# Patient Record
Sex: MALE | Race: WHITE | Employment: FULL TIME | URBAN - METROPOLITAN AREA
[De-identification: names, ages, dates, MRNs, and addresses within clinical notes are randomized per-mention and may not be internally consistent; named-entity substitution may affect disease eponyms.]

---

## 2018-02-08 PROBLEM — Z30.09 VASECTOMY EVALUATION: Status: ACTIVE | Noted: 2018-02-08

## 2018-04-05 ENCOUNTER — APPOINTMENT (OUTPATIENT)
Dept: CT IMAGING | Age: 43
End: 2018-04-05
Payer: COMMERCIAL

## 2018-04-05 ENCOUNTER — HOSPITAL ENCOUNTER (EMERGENCY)
Age: 43
Discharge: HOME | End: 2018-04-05
Payer: COMMERCIAL

## 2018-04-05 VITALS
RESPIRATION RATE: 16 BRPM | HEIGHT: 69 IN | TEMPERATURE: 97.9 F | WEIGHT: 145 LBS | BODY MASS INDEX: 21.48 KG/M2 | SYSTOLIC BLOOD PRESSURE: 130 MMHG | OXYGEN SATURATION: 98 % | DIASTOLIC BLOOD PRESSURE: 80 MMHG | HEART RATE: 80 BPM
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
## 2018-04-05 DIAGNOSIS — S16.1XXA STRAIN OF NECK MUSCLE, INITIAL ENCOUNTER: ICD-10-CM

## 2018-04-05 PROCEDURE — 99284 EMERGENCY DEPT VISIT MOD MDM: CPT

## 2018-04-05 PROCEDURE — 6370000000 HC RX 637 (ALT 250 FOR IP): Performed by: PHYSICIAN ASSISTANT

## 2018-04-05 PROCEDURE — 70450 CT HEAD/BRAIN W/O DYE: CPT

## 2018-04-05 PROCEDURE — 72125 CT NECK SPINE W/O DYE: CPT

## 2018-04-05 RX ORDER — ACETAMINOPHEN 325 MG/1
650 TABLET ORAL ONCE
Status: COMPLETED | OUTPATIENT
Start: 2018-04-05 | End: 2018-04-05

## 2018-04-05 RX ADMIN — ACETAMINOPHEN 650 MG: 325 TABLET, FILM COATED ORAL at 17:50

## 2018-04-05 ASSESSMENT — PAIN DESCRIPTION - DESCRIPTORS: DESCRIPTORS: ACHING;THROBBING

## 2018-04-05 ASSESSMENT — PAIN DESCRIPTION - FREQUENCY: FREQUENCY: CONTINUOUS

## 2018-04-05 ASSESSMENT — PAIN DESCRIPTION - LOCATION: LOCATION: NECK

## 2018-04-05 ASSESSMENT — PAIN SCALES - GENERAL
PAINLEVEL_OUTOF10: 8
PAINLEVEL_OUTOF10: 9
PAINLEVEL_OUTOF10: 9

## 2018-04-23 ENCOUNTER — APPOINTMENT (OUTPATIENT)
Dept: GENERAL RADIOLOGY | Age: 43
End: 2018-04-23
Attending: INTERNAL MEDICINE
Payer: COMMERCIAL

## 2018-04-23 ENCOUNTER — HOSPITAL ENCOUNTER (OUTPATIENT)
Age: 43
Setting detail: OBSERVATION
Discharge: HOME | End: 2018-04-25
Attending: INTERNAL MEDICINE | Admitting: INTERNAL MEDICINE
Payer: COMMERCIAL

## 2018-04-23 DIAGNOSIS — M54.2 NECK PAIN: Primary | ICD-10-CM

## 2018-04-23 PROBLEM — R52 INTRACTABLE PAIN: Status: ACTIVE | Noted: 2018-04-23

## 2018-04-23 PROCEDURE — 6360000002 HC RX W HCPCS: Performed by: INTERNAL MEDICINE

## 2018-04-23 PROCEDURE — G0378 HOSPITAL OBSERVATION PER HR: HCPCS

## 2018-04-23 PROCEDURE — 72050 X-RAY EXAM NECK SPINE 4/5VWS: CPT

## 2018-04-23 PROCEDURE — 99219 PR INITIAL OBSERVATION CARE/DAY 50 MINUTES: CPT | Performed by: NEUROLOGICAL SURGERY

## 2018-04-23 PROCEDURE — 6370000000 HC RX 637 (ALT 250 FOR IP): Performed by: INTERNAL MEDICINE

## 2018-04-23 RX ORDER — QUETIAPINE FUMARATE 25 MG/1
50 TABLET, FILM COATED ORAL NIGHTLY
Status: DISCONTINUED | OUTPATIENT
Start: 2018-04-23 | End: 2018-04-25 | Stop reason: HOSPADM

## 2018-04-23 RX ORDER — HYDROCODONE BITARTRATE AND ACETAMINOPHEN 5; 325 MG/1; MG/1
1 TABLET ORAL EVERY 6 HOURS PRN
Status: ON HOLD | COMMUNITY
End: 2018-04-25 | Stop reason: HOSPADM

## 2018-04-23 RX ORDER — SIMVASTATIN 20 MG
20 TABLET ORAL NIGHTLY
Status: DISCONTINUED | OUTPATIENT
Start: 2018-04-23 | End: 2018-04-25 | Stop reason: HOSPADM

## 2018-04-23 RX ORDER — DEXAMETHASONE SODIUM PHOSPHATE 4 MG/ML
4 INJECTION, SOLUTION INTRA-ARTICULAR; INTRALESIONAL; INTRAMUSCULAR; INTRAVENOUS; SOFT TISSUE EVERY 8 HOURS
Status: DISCONTINUED | OUTPATIENT
Start: 2018-04-23 | End: 2018-04-25 | Stop reason: HOSPADM

## 2018-04-23 RX ORDER — IBUPROFEN 600 MG/1
600 TABLET ORAL 3 TIMES DAILY
Status: DISCONTINUED | OUTPATIENT
Start: 2018-04-23 | End: 2018-04-25 | Stop reason: HOSPADM

## 2018-04-23 RX ORDER — CYCLOBENZAPRINE HCL 10 MG
10 TABLET ORAL NIGHTLY
Status: DISCONTINUED | OUTPATIENT
Start: 2018-04-23 | End: 2018-04-25

## 2018-04-23 RX ORDER — HYDROCODONE BITARTRATE AND ACETAMINOPHEN 5; 325 MG/1; MG/1
1 TABLET ORAL EVERY 6 HOURS PRN
Status: DISCONTINUED | OUTPATIENT
Start: 2018-04-23 | End: 2018-04-25 | Stop reason: HOSPADM

## 2018-04-23 RX ORDER — GABAPENTIN 300 MG/1
300 CAPSULE ORAL 3 TIMES DAILY
Status: DISCONTINUED | OUTPATIENT
Start: 2018-04-23 | End: 2018-04-25 | Stop reason: HOSPADM

## 2018-04-23 RX ADMIN — HYDROCODONE BITARTRATE AND ACETAMINOPHEN 1 TABLET: 5; 325 TABLET ORAL at 20:52

## 2018-04-23 RX ADMIN — DEXAMETHASONE SODIUM PHOSPHATE 4 MG: 4 INJECTION, SOLUTION INTRAMUSCULAR; INTRAVENOUS at 20:51

## 2018-04-23 RX ADMIN — QUETIAPINE FUMARATE 50 MG: 25 TABLET ORAL at 20:52

## 2018-04-23 RX ADMIN — SIMVASTATIN 20 MG: 20 TABLET, FILM COATED ORAL at 20:52

## 2018-04-23 RX ADMIN — CYCLOBENZAPRINE 10 MG: 10 TABLET, FILM COATED ORAL at 20:52

## 2018-04-23 RX ADMIN — GABAPENTIN 300 MG: 300 CAPSULE ORAL at 20:52

## 2018-04-23 ASSESSMENT — PAIN DESCRIPTION - DESCRIPTORS
DESCRIPTORS: ACHING;DISCOMFORT;DULL
DESCRIPTORS: ACHING;DISCOMFORT;DULL

## 2018-04-23 ASSESSMENT — PAIN DESCRIPTION - LOCATION
LOCATION: NECK
LOCATION: NECK

## 2018-04-23 ASSESSMENT — PAIN DESCRIPTION - FREQUENCY
FREQUENCY: CONTINUOUS
FREQUENCY: CONTINUOUS

## 2018-04-23 ASSESSMENT — PAIN DESCRIPTION - PROGRESSION
CLINICAL_PROGRESSION: GRADUALLY WORSENING
CLINICAL_PROGRESSION: GRADUALLY IMPROVING

## 2018-04-23 ASSESSMENT — PAIN SCALES - GENERAL
PAINLEVEL_OUTOF10: 8
PAINLEVEL_OUTOF10: 9
PAINLEVEL_OUTOF10: 7

## 2018-04-23 ASSESSMENT — PAIN DESCRIPTION - ONSET
ONSET: ON-GOING
ONSET: ON-GOING

## 2018-04-23 ASSESSMENT — PAIN DESCRIPTION - ORIENTATION
ORIENTATION: MID;LOWER
ORIENTATION: MID;LOWER

## 2018-04-23 ASSESSMENT — PAIN DESCRIPTION - PAIN TYPE
TYPE: ACUTE PAIN
TYPE: ACUTE PAIN

## 2018-04-24 ENCOUNTER — APPOINTMENT (OUTPATIENT)
Dept: MRI IMAGING | Age: 43
End: 2018-04-24
Attending: INTERNAL MEDICINE
Payer: COMMERCIAL

## 2018-04-24 PROCEDURE — G0378 HOSPITAL OBSERVATION PER HR: HCPCS

## 2018-04-24 PROCEDURE — 97161 PT EVAL LOW COMPLEX 20 MIN: CPT

## 2018-04-24 PROCEDURE — 6360000002 HC RX W HCPCS: Performed by: INTERNAL MEDICINE

## 2018-04-24 PROCEDURE — G8987 SELF CARE CURRENT STATUS: HCPCS

## 2018-04-24 PROCEDURE — 72156 MRI NECK SPINE W/O & W/DYE: CPT

## 2018-04-24 PROCEDURE — A9579 GAD-BASE MR CONTRAST NOS,1ML: HCPCS | Performed by: RADIOLOGY

## 2018-04-24 PROCEDURE — 6370000000 HC RX 637 (ALT 250 FOR IP): Performed by: INTERNAL MEDICINE

## 2018-04-24 PROCEDURE — 99225 PR SBSQ OBSERVATION CARE/DAY 25 MINUTES: CPT | Performed by: NEUROLOGICAL SURGERY

## 2018-04-24 PROCEDURE — G8978 MOBILITY CURRENT STATUS: HCPCS

## 2018-04-24 PROCEDURE — 2580000003 HC RX 258

## 2018-04-24 PROCEDURE — 97165 OT EVAL LOW COMPLEX 30 MIN: CPT

## 2018-04-24 PROCEDURE — G8988 SELF CARE GOAL STATUS: HCPCS

## 2018-04-24 PROCEDURE — 6360000004 HC RX CONTRAST MEDICATION: Performed by: RADIOLOGY

## 2018-04-24 RX ORDER — SODIUM CHLORIDE 0.9 % (FLUSH) 0.9 %
SYRINGE (ML) INJECTION
Status: COMPLETED
Start: 2018-04-24 | End: 2018-04-24

## 2018-04-24 RX ADMIN — IBUPROFEN 600 MG: 600 TABLET ORAL at 20:24

## 2018-04-24 RX ADMIN — QUETIAPINE FUMARATE 50 MG: 25 TABLET ORAL at 20:24

## 2018-04-24 RX ADMIN — CYCLOBENZAPRINE 10 MG: 10 TABLET, FILM COATED ORAL at 20:24

## 2018-04-24 RX ADMIN — HYDROCODONE BITARTRATE AND ACETAMINOPHEN 1 TABLET: 5; 325 TABLET ORAL at 11:39

## 2018-04-24 RX ADMIN — SIMVASTATIN 20 MG: 20 TABLET, FILM COATED ORAL at 20:25

## 2018-04-24 RX ADMIN — Medication: at 05:20

## 2018-04-24 RX ADMIN — HYDROCODONE BITARTRATE AND ACETAMINOPHEN 1 TABLET: 5; 325 TABLET ORAL at 05:17

## 2018-04-24 RX ADMIN — HYDROCODONE BITARTRATE AND ACETAMINOPHEN 1 TABLET: 5; 325 TABLET ORAL at 18:49

## 2018-04-24 RX ADMIN — IBUPROFEN 600 MG: 600 TABLET ORAL at 16:18

## 2018-04-24 RX ADMIN — GABAPENTIN 300 MG: 300 CAPSULE ORAL at 11:29

## 2018-04-24 RX ADMIN — DEXAMETHASONE SODIUM PHOSPHATE 4 MG: 4 INJECTION, SOLUTION INTRAMUSCULAR; INTRAVENOUS at 05:16

## 2018-04-24 RX ADMIN — GABAPENTIN 300 MG: 300 CAPSULE ORAL at 20:25

## 2018-04-24 RX ADMIN — GABAPENTIN 300 MG: 300 CAPSULE ORAL at 14:06

## 2018-04-24 RX ADMIN — GADOTERIDOL 13 ML: 279.3 INJECTION, SOLUTION INTRAVENOUS at 10:08

## 2018-04-24 RX ADMIN — DEXAMETHASONE SODIUM PHOSPHATE 4 MG: 4 INJECTION, SOLUTION INTRAMUSCULAR; INTRAVENOUS at 14:06

## 2018-04-24 RX ADMIN — DEXAMETHASONE SODIUM PHOSPHATE 4 MG: 4 INJECTION, SOLUTION INTRAMUSCULAR; INTRAVENOUS at 20:24

## 2018-04-24 RX ADMIN — IBUPROFEN 600 MG: 600 TABLET ORAL at 11:29

## 2018-04-24 ASSESSMENT — PAIN DESCRIPTION - DESCRIPTORS
DESCRIPTORS: ACHING;DISCOMFORT;SHOOTING
DESCRIPTORS: ACHING;CONSTANT;DISCOMFORT
DESCRIPTORS: ACHING;BURNING;DISCOMFORT;TINGLING

## 2018-04-24 ASSESSMENT — PAIN DESCRIPTION - ONSET
ONSET: ON-GOING
ONSET: ON-GOING

## 2018-04-24 ASSESSMENT — PAIN DESCRIPTION - DIRECTION
RADIATING_TOWARDS: RIGHT ARM
RADIATING_TOWARDS: RIGHT ARM

## 2018-04-24 ASSESSMENT — PAIN SCALES - GENERAL
PAINLEVEL_OUTOF10: 7
PAINLEVEL_OUTOF10: 6
PAINLEVEL_OUTOF10: 5
PAINLEVEL_OUTOF10: 7
PAINLEVEL_OUTOF10: 2
PAINLEVEL_OUTOF10: 7
PAINLEVEL_OUTOF10: 0
PAINLEVEL_OUTOF10: 8

## 2018-04-24 ASSESSMENT — ACTIVITIES OF DAILY LIVING (ADL): EFFECT OF PAIN ON DAILY ACTIVITIES: DISCOMFORT

## 2018-04-24 ASSESSMENT — PAIN DESCRIPTION - LOCATION
LOCATION: BACK;NECK
LOCATION: NECK;BACK
LOCATION: BACK;NECK

## 2018-04-24 ASSESSMENT — PAIN DESCRIPTION - PROGRESSION
CLINICAL_PROGRESSION: NOT CHANGED
CLINICAL_PROGRESSION: GRADUALLY WORSENING

## 2018-04-24 ASSESSMENT — PAIN DESCRIPTION - PAIN TYPE
TYPE: ACUTE PAIN

## 2018-04-24 ASSESSMENT — PAIN DESCRIPTION - FREQUENCY
FREQUENCY: CONTINUOUS
FREQUENCY: CONTINUOUS

## 2018-04-25 VITALS
DIASTOLIC BLOOD PRESSURE: 72 MMHG | BODY MASS INDEX: 22.28 KG/M2 | WEIGHT: 147 LBS | HEIGHT: 68 IN | RESPIRATION RATE: 16 BRPM | SYSTOLIC BLOOD PRESSURE: 140 MMHG | HEART RATE: 88 BPM | OXYGEN SATURATION: 97 % | TEMPERATURE: 97.6 F

## 2018-04-25 PROCEDURE — 6360000002 HC RX W HCPCS: Performed by: INTERNAL MEDICINE

## 2018-04-25 PROCEDURE — 99225 PR SBSQ OBSERVATION CARE/DAY 25 MINUTES: CPT | Performed by: NEUROLOGICAL SURGERY

## 2018-04-25 PROCEDURE — 6370000000 HC RX 637 (ALT 250 FOR IP): Performed by: PHYSICIAN ASSISTANT

## 2018-04-25 PROCEDURE — G0378 HOSPITAL OBSERVATION PER HR: HCPCS

## 2018-04-25 PROCEDURE — 6370000000 HC RX 637 (ALT 250 FOR IP): Performed by: INTERNAL MEDICINE

## 2018-04-25 RX ORDER — HYDROCODONE BITARTRATE AND ACETAMINOPHEN 5; 325 MG/1; MG/1
2 TABLET ORAL EVERY 4 HOURS PRN
Qty: 80 TABLET | Refills: 0 | Status: SHIPPED | OUTPATIENT
Start: 2018-04-25 | End: 2018-04-25 | Stop reason: SDUPTHER

## 2018-04-25 RX ORDER — TIZANIDINE 4 MG/1
4 TABLET ORAL EVERY 8 HOURS PRN
Qty: 60 TABLET | Refills: 0 | Status: SHIPPED | OUTPATIENT
Start: 2018-04-25 | End: 2018-06-01 | Stop reason: SDUPTHER

## 2018-04-25 RX ORDER — TIZANIDINE 4 MG/1
4 TABLET ORAL EVERY 6 HOURS PRN
Status: DISCONTINUED | OUTPATIENT
Start: 2018-04-25 | End: 2018-04-25 | Stop reason: HOSPADM

## 2018-04-25 RX ADMIN — DEXAMETHASONE SODIUM PHOSPHATE 4 MG: 4 INJECTION, SOLUTION INTRAMUSCULAR; INTRAVENOUS at 12:12

## 2018-04-25 RX ADMIN — GABAPENTIN 300 MG: 300 CAPSULE ORAL at 13:07

## 2018-04-25 RX ADMIN — HYDROCODONE BITARTRATE AND ACETAMINOPHEN 1 TABLET: 5; 325 TABLET ORAL at 07:53

## 2018-04-25 RX ADMIN — GABAPENTIN 300 MG: 300 CAPSULE ORAL at 07:52

## 2018-04-25 RX ADMIN — TIZANIDINE 4 MG: 4 TABLET ORAL at 13:07

## 2018-04-25 RX ADMIN — IBUPROFEN 600 MG: 600 TABLET ORAL at 13:08

## 2018-04-25 RX ADMIN — DEXAMETHASONE SODIUM PHOSPHATE 4 MG: 4 INJECTION, SOLUTION INTRAMUSCULAR; INTRAVENOUS at 05:43

## 2018-04-25 RX ADMIN — HYDROCODONE BITARTRATE AND ACETAMINOPHEN 1 TABLET: 5; 325 TABLET ORAL at 00:52

## 2018-04-25 RX ADMIN — HYDROCODONE BITARTRATE AND ACETAMINOPHEN 1 TABLET: 5; 325 TABLET ORAL at 13:54

## 2018-04-25 RX ADMIN — IBUPROFEN 600 MG: 600 TABLET ORAL at 08:52

## 2018-04-25 ASSESSMENT — PAIN DESCRIPTION - PAIN TYPE
TYPE: ACUTE PAIN

## 2018-04-25 ASSESSMENT — PAIN DESCRIPTION - ONSET
ONSET: ON-GOING

## 2018-04-25 ASSESSMENT — PAIN DESCRIPTION - DESCRIPTORS
DESCRIPTORS: ACHING;DISCOMFORT
DESCRIPTORS: ACHING;DISCOMFORT;SORE
DESCRIPTORS: ACHING;DISCOMFORT
DESCRIPTORS: ACHING;DISCOMFORT;NAGGING

## 2018-04-25 ASSESSMENT — PAIN SCALES - GENERAL
PAINLEVEL_OUTOF10: 5
PAINLEVEL_OUTOF10: 6
PAINLEVEL_OUTOF10: 7
PAINLEVEL_OUTOF10: 5
PAINLEVEL_OUTOF10: 0

## 2018-04-25 ASSESSMENT — PAIN DESCRIPTION - ORIENTATION
ORIENTATION: LOWER;MID
ORIENTATION: LOWER;MID
ORIENTATION: MID;LOWER

## 2018-04-25 ASSESSMENT — PAIN DESCRIPTION - FREQUENCY
FREQUENCY: CONTINUOUS

## 2018-04-25 ASSESSMENT — PAIN DESCRIPTION - LOCATION
LOCATION: BACK;NECK
LOCATION: NECK
LOCATION: NECK
LOCATION: BACK;NECK
LOCATION: BACK;NECK

## 2018-04-25 ASSESSMENT — PAIN DESCRIPTION - PROGRESSION: CLINICAL_PROGRESSION: NOT CHANGED

## 2018-04-25 ASSESSMENT — PAIN DESCRIPTION - DIRECTION: RADIATING_TOWARDS: RT ARM

## 2018-07-30 ENCOUNTER — APPOINTMENT (OUTPATIENT)
Dept: CT IMAGING | Age: 43
DRG: 203 | End: 2018-07-30
Attending: INTERNAL MEDICINE
Payer: COMMERCIAL

## 2018-07-30 ENCOUNTER — APPOINTMENT (OUTPATIENT)
Dept: GENERAL RADIOLOGY | Age: 43
DRG: 203 | End: 2018-07-30
Attending: INTERNAL MEDICINE
Payer: COMMERCIAL

## 2018-07-30 ENCOUNTER — HOSPITAL ENCOUNTER (INPATIENT)
Age: 43
LOS: 7 days | Discharge: HOME | DRG: 203 | End: 2018-08-06
Attending: INTERNAL MEDICINE | Admitting: INTERNAL MEDICINE
Payer: COMMERCIAL

## 2018-07-30 PROBLEM — J98.01 BRONCHOSPASM: Status: ACTIVE | Noted: 2018-07-30

## 2018-07-30 LAB
ALBUMIN SERPL-MCNC: 4.2 G/DL (ref 3.5–5.2)
ALP BLD-CCNC: 59 U/L (ref 40–129)
ALT SERPL-CCNC: 27 U/L (ref 0–40)
ANION GAP SERPL CALCULATED.3IONS-SCNC: 11 MMOL/L (ref 7–16)
AST SERPL-CCNC: 17 U/L (ref 0–39)
BILIRUB SERPL-MCNC: 0.4 MG/DL (ref 0–1.2)
BUN BLDV-MCNC: 14 MG/DL (ref 6–20)
CALCIUM SERPL-MCNC: 9.3 MG/DL (ref 8.6–10.2)
CHLORIDE BLD-SCNC: 105 MMOL/L (ref 98–107)
CO2: 25 MMOL/L (ref 22–29)
CREAT SERPL-MCNC: 0.9 MG/DL (ref 0.7–1.2)
EKG ATRIAL RATE: 80 BPM
EKG P AXIS: 36 DEGREES
EKG P-R INTERVAL: 150 MS
EKG Q-T INTERVAL: 386 MS
EKG QRS DURATION: 104 MS
EKG QTC CALCULATION (BAZETT): 445 MS
EKG R AXIS: 24 DEGREES
EKG T AXIS: 27 DEGREES
EKG VENTRICULAR RATE: 80 BPM
FILM ARRAY ADENOVIRUS: NORMAL
FILM ARRAY BORDETELLA PERTUSSIS: NORMAL
FILM ARRAY CHLAMYDOPHILIA PNEUMONIAE: NORMAL
FILM ARRAY CORONAVIRUS 229E: NORMAL
FILM ARRAY CORONAVIRUS HKU1: NORMAL
FILM ARRAY CORONAVIRUS NL63: NORMAL
FILM ARRAY CORONAVIRUS OC43: NORMAL
FILM ARRAY INFLUENZA A VIRUS 09H1: NORMAL
FILM ARRAY INFLUENZA A VIRUS H1: NORMAL
FILM ARRAY INFLUENZA A VIRUS H3: NORMAL
FILM ARRAY INFLUENZA A VIRUS: NORMAL
FILM ARRAY INFLUENZA B: NORMAL
FILM ARRAY METAPNEUMOVIRUS: NORMAL
FILM ARRAY MYCOPLASMA PNEUMONIAE: NORMAL
FILM ARRAY PARAINFLUENZA VIRUS 1: NORMAL
FILM ARRAY PARAINFLUENZA VIRUS 2: NORMAL
FILM ARRAY PARAINFLUENZA VIRUS 3: NORMAL
FILM ARRAY PARAINFLUENZA VIRUS 4: NORMAL
FILM ARRAY RESPIRATORY SYNCITIAL VIRUS: NORMAL
FILM ARRAY RHINOVIRUS/ENTEROVIRUS: NORMAL
GFR AFRICAN AMERICAN: >60
GFR NON-AFRICAN AMERICAN: >60 ML/MIN/1.73
GLUCOSE BLD-MCNC: 106 MG/DL (ref 74–109)
HCT VFR BLD CALC: 39.1 % (ref 37–54)
HEMOGLOBIN: 13.6 G/DL (ref 12.5–16.5)
MCH RBC QN AUTO: 30.6 PG (ref 26–35)
MCHC RBC AUTO-ENTMCNC: 34.8 % (ref 32–34.5)
MCV RBC AUTO: 88.1 FL (ref 80–99.9)
PDW BLD-RTO: 12.7 FL (ref 11.5–15)
PLATELET # BLD: 236 E9/L (ref 130–450)
PMV BLD AUTO: 10 FL (ref 7–12)
POTASSIUM SERPL-SCNC: 3.2 MMOL/L (ref 3.5–5)
PRO-BNP: 32 PG/ML (ref 0–125)
RBC # BLD: 4.44 E12/L (ref 3.8–5.8)
SODIUM BLD-SCNC: 141 MMOL/L (ref 132–146)
TOTAL PROTEIN: 6.4 G/DL (ref 6.4–8.3)
WBC # BLD: 7.9 E9/L (ref 4.5–11.5)

## 2018-07-30 PROCEDURE — 1200000000 HC SEMI PRIVATE

## 2018-07-30 PROCEDURE — 93005 ELECTROCARDIOGRAM TRACING: CPT | Performed by: INTERNAL MEDICINE

## 2018-07-30 PROCEDURE — 87503 INFLUENZA DNA AMP PROB ADDL: CPT

## 2018-07-30 PROCEDURE — 71045 X-RAY EXAM CHEST 1 VIEW: CPT

## 2018-07-30 PROCEDURE — 71250 CT THORAX DX C-: CPT

## 2018-07-30 PROCEDURE — 80053 COMPREHEN METABOLIC PANEL: CPT

## 2018-07-30 PROCEDURE — 36415 COLL VENOUS BLD VENIPUNCTURE: CPT

## 2018-07-30 PROCEDURE — 87502 INFLUENZA DNA AMP PROBE: CPT

## 2018-07-30 PROCEDURE — 85027 COMPLETE CBC AUTOMATED: CPT

## 2018-07-30 PROCEDURE — 6370000000 HC RX 637 (ALT 250 FOR IP): Performed by: INTERNAL MEDICINE

## 2018-07-30 PROCEDURE — 94664 DEMO&/EVAL PT USE INHALER: CPT

## 2018-07-30 PROCEDURE — 87798 DETECT AGENT NOS DNA AMP: CPT

## 2018-07-30 PROCEDURE — 83880 ASSAY OF NATRIURETIC PEPTIDE: CPT

## 2018-07-30 PROCEDURE — 6360000002 HC RX W HCPCS: Performed by: INTERNAL MEDICINE

## 2018-07-30 PROCEDURE — 87486 CHLMYD PNEUM DNA AMP PROBE: CPT

## 2018-07-30 PROCEDURE — 87581 M.PNEUMON DNA AMP PROBE: CPT

## 2018-07-30 RX ORDER — GABAPENTIN 300 MG/1
300 CAPSULE ORAL 3 TIMES DAILY
Status: DISCONTINUED | OUTPATIENT
Start: 2018-07-30 | End: 2018-08-06 | Stop reason: HOSPADM

## 2018-07-30 RX ORDER — QUETIAPINE FUMARATE 25 MG/1
50 TABLET, FILM COATED ORAL NIGHTLY
Status: DISCONTINUED | OUTPATIENT
Start: 2018-07-30 | End: 2018-08-06 | Stop reason: HOSPADM

## 2018-07-30 RX ORDER — HYDROCODONE BITARTRATE AND ACETAMINOPHEN 5; 325 MG/1; MG/1
1 TABLET ORAL EVERY 4 HOURS PRN
COMMUNITY
End: 2019-02-22 | Stop reason: ALTCHOICE

## 2018-07-30 RX ORDER — IPRATROPIUM BROMIDE AND ALBUTEROL SULFATE 2.5; .5 MG/3ML; MG/3ML
1 SOLUTION RESPIRATORY (INHALATION)
Status: DISCONTINUED | OUTPATIENT
Start: 2018-07-30 | End: 2018-07-31

## 2018-07-30 RX ORDER — HYDROCODONE BITARTRATE AND ACETAMINOPHEN 5; 325 MG/1; MG/1
1 TABLET ORAL EVERY 4 HOURS PRN
Status: DISCONTINUED | OUTPATIENT
Start: 2018-07-30 | End: 2018-08-06 | Stop reason: HOSPADM

## 2018-07-30 RX ORDER — POTASSIUM CHLORIDE 20 MEQ/1
40 TABLET, EXTENDED RELEASE ORAL ONCE
Status: COMPLETED | OUTPATIENT
Start: 2018-07-30 | End: 2018-07-30

## 2018-07-30 RX ORDER — POTASSIUM CHLORIDE 20 MEQ/1
20 TABLET, EXTENDED RELEASE ORAL ONCE
Status: COMPLETED | OUTPATIENT
Start: 2018-07-30 | End: 2018-07-30

## 2018-07-30 RX ORDER — METHYLPREDNISOLONE SODIUM SUCCINATE 40 MG/ML
40 INJECTION, POWDER, LYOPHILIZED, FOR SOLUTION INTRAMUSCULAR; INTRAVENOUS EVERY 8 HOURS
Status: COMPLETED | OUTPATIENT
Start: 2018-07-30 | End: 2018-08-04

## 2018-07-30 RX ADMIN — HYDROCODONE BITARTRATE AND ACETAMINOPHEN 1 TABLET: 5; 325 TABLET ORAL at 17:13

## 2018-07-30 RX ADMIN — GABAPENTIN 300 MG: 300 CAPSULE ORAL at 17:00

## 2018-07-30 RX ADMIN — METHYLPREDNISOLONE SODIUM SUCCINATE 40 MG: 40 INJECTION, POWDER, LYOPHILIZED, FOR SOLUTION INTRAMUSCULAR; INTRAVENOUS at 17:00

## 2018-07-30 RX ADMIN — POTASSIUM CHLORIDE 20 MEQ: 1500 TABLET, EXTENDED RELEASE ORAL at 20:46

## 2018-07-30 RX ADMIN — GABAPENTIN 300 MG: 300 CAPSULE ORAL at 20:46

## 2018-07-30 RX ADMIN — IPRATROPIUM BROMIDE AND ALBUTEROL SULFATE 1 AMPULE: .5; 3 SOLUTION RESPIRATORY (INHALATION) at 19:46

## 2018-07-30 RX ADMIN — HYDROCODONE BITARTRATE AND ACETAMINOPHEN 1 TABLET: 5; 325 TABLET ORAL at 22:44

## 2018-07-30 RX ADMIN — POTASSIUM CHLORIDE 40 MEQ: 1500 TABLET, EXTENDED RELEASE ORAL at 18:20

## 2018-07-30 RX ADMIN — QUETIAPINE FUMARATE 50 MG: 25 TABLET ORAL at 20:46

## 2018-07-30 ASSESSMENT — PAIN DESCRIPTION - PROGRESSION
CLINICAL_PROGRESSION: GRADUALLY WORSENING
CLINICAL_PROGRESSION: GRADUALLY WORSENING

## 2018-07-30 ASSESSMENT — PAIN DESCRIPTION - DESCRIPTORS
DESCRIPTORS: CONSTANT;DISCOMFORT;SORE
DESCRIPTORS: ACHING;DISCOMFORT;CONSTANT

## 2018-07-30 ASSESSMENT — PAIN SCALES - GENERAL
PAINLEVEL_OUTOF10: 0
PAINLEVEL_OUTOF10: 8
PAINLEVEL_OUTOF10: 6
PAINLEVEL_OUTOF10: 5
PAINLEVEL_OUTOF10: 2

## 2018-07-30 ASSESSMENT — PAIN DESCRIPTION - ORIENTATION: ORIENTATION: RIGHT;LEFT;UPPER

## 2018-07-30 ASSESSMENT — PAIN DESCRIPTION - PAIN TYPE
TYPE: CHRONIC PAIN
TYPE: CHRONIC PAIN

## 2018-07-30 ASSESSMENT — PAIN DESCRIPTION - ONSET: ONSET: ON-GOING

## 2018-07-30 ASSESSMENT — PAIN DESCRIPTION - LOCATION
LOCATION: NECK
LOCATION: NECK;BACK

## 2018-07-30 ASSESSMENT — PAIN DESCRIPTION - FREQUENCY: FREQUENCY: CONTINUOUS

## 2018-07-31 LAB
ANION GAP SERPL CALCULATED.3IONS-SCNC: 13 MMOL/L (ref 7–16)
BUN BLDV-MCNC: 12 MG/DL (ref 6–20)
CALCIUM SERPL-MCNC: 9.8 MG/DL (ref 8.6–10.2)
CHLORIDE BLD-SCNC: 106 MMOL/L (ref 98–107)
CO2: 23 MMOL/L (ref 22–29)
CREAT SERPL-MCNC: 0.8 MG/DL (ref 0.7–1.2)
D DIMER: <200 NG/ML DDU
GFR AFRICAN AMERICAN: >60
GFR NON-AFRICAN AMERICAN: >60 ML/MIN/1.73
GLUCOSE BLD-MCNC: 143 MG/DL (ref 74–109)
POTASSIUM SERPL-SCNC: 4.3 MMOL/L (ref 3.5–5)
SODIUM BLD-SCNC: 142 MMOL/L (ref 132–146)

## 2018-07-31 PROCEDURE — 82785 ASSAY OF IGE: CPT

## 2018-07-31 PROCEDURE — 1200000000 HC SEMI PRIVATE

## 2018-07-31 PROCEDURE — 6360000002 HC RX W HCPCS: Performed by: INTERNAL MEDICINE

## 2018-07-31 PROCEDURE — 85378 FIBRIN DEGRADE SEMIQUANT: CPT

## 2018-07-31 PROCEDURE — 36415 COLL VENOUS BLD VENIPUNCTURE: CPT

## 2018-07-31 PROCEDURE — 6370000000 HC RX 637 (ALT 250 FOR IP): Performed by: INTERNAL MEDICINE

## 2018-07-31 PROCEDURE — 94640 AIRWAY INHALATION TREATMENT: CPT

## 2018-07-31 PROCEDURE — 80048 BASIC METABOLIC PNL TOTAL CA: CPT

## 2018-07-31 PROCEDURE — 2580000003 HC RX 258

## 2018-07-31 PROCEDURE — 87450 HC DIRECT STREP B ANTIGEN: CPT

## 2018-07-31 RX ORDER — FORMOTEROL FUMARATE 20 UG/2ML
20 SOLUTION RESPIRATORY (INHALATION) 2 TIMES DAILY
Status: DISCONTINUED | OUTPATIENT
Start: 2018-07-31 | End: 2018-08-06 | Stop reason: HOSPADM

## 2018-07-31 RX ORDER — SODIUM CHLORIDE 0.9 % (FLUSH) 0.9 %
SYRINGE (ML) INJECTION
Status: COMPLETED
Start: 2018-07-31 | End: 2018-07-31

## 2018-07-31 RX ORDER — BUDESONIDE 0.5 MG/2ML
1000 INHALANT ORAL 2 TIMES DAILY
Status: DISCONTINUED | OUTPATIENT
Start: 2018-07-31 | End: 2018-08-06 | Stop reason: HOSPADM

## 2018-07-31 RX ORDER — IPRATROPIUM BROMIDE AND ALBUTEROL SULFATE 2.5; .5 MG/3ML; MG/3ML
1 SOLUTION RESPIRATORY (INHALATION) EVERY 4 HOURS
Status: DISCONTINUED | OUTPATIENT
Start: 2018-07-31 | End: 2018-08-06 | Stop reason: HOSPADM

## 2018-07-31 RX ADMIN — IPRATROPIUM BROMIDE AND ALBUTEROL SULFATE 1 AMPULE: .5; 3 SOLUTION RESPIRATORY (INHALATION) at 11:55

## 2018-07-31 RX ADMIN — HYDROCODONE BITARTRATE AND ACETAMINOPHEN 1 TABLET: 5; 325 TABLET ORAL at 08:27

## 2018-07-31 RX ADMIN — HYDROCODONE BITARTRATE AND ACETAMINOPHEN 1 TABLET: 5; 325 TABLET ORAL at 12:35

## 2018-07-31 RX ADMIN — METHYLPREDNISOLONE SODIUM SUCCINATE 40 MG: 40 INJECTION, POWDER, LYOPHILIZED, FOR SOLUTION INTRAMUSCULAR; INTRAVENOUS at 16:56

## 2018-07-31 RX ADMIN — GABAPENTIN 300 MG: 300 CAPSULE ORAL at 20:20

## 2018-07-31 RX ADMIN — GABAPENTIN 300 MG: 300 CAPSULE ORAL at 10:04

## 2018-07-31 RX ADMIN — METHYLPREDNISOLONE SODIUM SUCCINATE 40 MG: 40 INJECTION, POWDER, LYOPHILIZED, FOR SOLUTION INTRAMUSCULAR; INTRAVENOUS at 10:04

## 2018-07-31 RX ADMIN — HYDROCODONE BITARTRATE AND ACETAMINOPHEN 1 TABLET: 5; 325 TABLET ORAL at 16:56

## 2018-07-31 RX ADMIN — METHYLPREDNISOLONE SODIUM SUCCINATE 40 MG: 40 INJECTION, POWDER, LYOPHILIZED, FOR SOLUTION INTRAMUSCULAR; INTRAVENOUS at 01:41

## 2018-07-31 RX ADMIN — Medication 10 ML: at 16:56

## 2018-07-31 RX ADMIN — HYDROCODONE BITARTRATE AND ACETAMINOPHEN 1 TABLET: 5; 325 TABLET ORAL at 21:28

## 2018-07-31 RX ADMIN — IPRATROPIUM BROMIDE AND ALBUTEROL SULFATE 1 AMPULE: .5; 3 SOLUTION RESPIRATORY (INHALATION) at 16:03

## 2018-07-31 RX ADMIN — QUETIAPINE FUMARATE 50 MG: 25 TABLET ORAL at 20:20

## 2018-07-31 RX ADMIN — GABAPENTIN 300 MG: 300 CAPSULE ORAL at 14:14

## 2018-07-31 RX ADMIN — BUDESONIDE 1000 MCG: 0.5 SUSPENSION RESPIRATORY (INHALATION) at 20:59

## 2018-07-31 RX ADMIN — Medication 10 ML: at 08:27

## 2018-07-31 RX ADMIN — HYDROCODONE BITARTRATE AND ACETAMINOPHEN 1 TABLET: 5; 325 TABLET ORAL at 02:45

## 2018-07-31 RX ADMIN — IPRATROPIUM BROMIDE AND ALBUTEROL SULFATE 1 AMPULE: .5; 3 SOLUTION RESPIRATORY (INHALATION) at 08:17

## 2018-07-31 RX ADMIN — FORMOTEROL FUMARATE DIHYDRATE 20 MCG: 20 SOLUTION RESPIRATORY (INHALATION) at 20:59

## 2018-07-31 ASSESSMENT — PAIN DESCRIPTION - FREQUENCY
FREQUENCY: CONTINUOUS

## 2018-07-31 ASSESSMENT — PAIN SCALES - GENERAL
PAINLEVEL_OUTOF10: 2
PAINLEVEL_OUTOF10: 6
PAINLEVEL_OUTOF10: 8
PAINLEVEL_OUTOF10: 6
PAINLEVEL_OUTOF10: 8

## 2018-07-31 ASSESSMENT — PAIN DESCRIPTION - DESCRIPTORS
DESCRIPTORS: ACHING;DISCOMFORT;DULL
DESCRIPTORS: ACHING;DISCOMFORT;SORE
DESCRIPTORS: ACHING;DISCOMFORT;SORE

## 2018-07-31 ASSESSMENT — PAIN DESCRIPTION - LOCATION
LOCATION: NECK;SHOULDER
LOCATION: NECK;BACK
LOCATION: NECK;SHOULDER

## 2018-07-31 ASSESSMENT — PAIN DESCRIPTION - PROGRESSION
CLINICAL_PROGRESSION: GRADUALLY WORSENING

## 2018-07-31 ASSESSMENT — PAIN DESCRIPTION - ORIENTATION
ORIENTATION: RIGHT;LEFT
ORIENTATION: RIGHT;LEFT;UPPER
ORIENTATION: RIGHT;LEFT

## 2018-07-31 ASSESSMENT — PAIN DESCRIPTION - PAIN TYPE
TYPE: CHRONIC PAIN

## 2018-07-31 ASSESSMENT — PAIN DESCRIPTION - ONSET
ONSET: ON-GOING

## 2018-07-31 NOTE — CONSULTS
rash on exposed extremities. Lymph: No cervical LAD. No supraclavicular LAD. Ext: No joint deformity. No clubbing. No cyanosis. No edema  Neuro: Awake. Follows commands. Positive pupils/gag/corneals. Normal pain response. Lab Results:  CBC:   Recent Labs      07/30/18   1700   WBC  7.9   HGB  13.6   HCT  39.1   MCV  88.1   PLT  236       BMP:  Recent Labs      07/30/18   1700  07/31/18   0515   NA  141  142   K  3.2*  4.3   CL  105  106   CO2  25  23   BUN  14  12   CREATININE  0.9  0.8    ALB:3,BILIDIR:3,BILITOT:3,ALKPHOS:3)@    PT/INR: No results for input(s): PROTIME, INR in the last 72 hours. Cultures:  Sputum: not available  Blood: not available    ABG:   No results for input(s): PH, PO2, PCO2, HCO3, BE, O2SAT, METHB, O2HB, COHB, O2CON, HHB, THB in the last 72 hours. Films:     CT Chest WO Contrast   Final Result      1. Subtle thickening of central airways could be a manifestation of   bronchitis/airway inflammation. 2. There may be a very small retrocardiac hiatus hernia, and nocturnal   reflux can result in subtle aspiration and bronchitis symptoms. 3. There is no evidence of chest wall or cardiovascular pathology, and   no evidence of organized pneumonia or effusion. XR CHEST PORTABLE   Final Result   No radiographic evidence of acute cardiopulmonary disease. Postoperative changes as above. .        Assessment:  1. Bronchospasm: Acute. Underlying asthma is not excluded. Unfortunately, because a differential count was never sent, atopy cannot be excluded nor can eosinophilia. The chest CT shows modest airway thickening but otherwise no significant pathology. The d-dimer being less than 200 virtually excludes pulmonary embolism in the differential diagnosis. Plan:  1. Adjust aerosol treatments to include long-acting beta agonists and high-dose inhaled corticosteroid. DuoNeb treatments will be increased to every 4 hours straight.   2. Anticipate discharge in 48 hours. Thanks for letting us see this patient in consultation. Please contact us with any questions. Office [phone] or after hours through [employer], x [phone]. Please note that voice recognition technology was used in the preparation of this note and make therefore it may contain inadvertent transcription errors    Kay Moseley M.D., F.C.C.P.     [facility], [address] [place] [address], [address] [place] [address]

## 2018-07-31 NOTE — PATIENT CARE CONFERENCE
[facility] Quality Flow/Interdisciplinary Rounds Progress Note        Quality Flow Rounds held on July 31, 2018    Disciplines Attending:  Bedside Nurse, ,  and Nursing Unit Leadership    Mary Ann Monroy was admitted on 7/30/2018  4:15 PM    Anticipated Discharge Date:  Expected Discharge Date: 08/02/18    Disposition:    Christopher Score:  Christopher Scale Score: 22    Readmission Risk              Risk of Unplanned Readmission:        9             Discussed patient goal for the day, patient clinical progression, and barriers to discharge.   The following Goal(s) of the Day/Commitment(s) have been identified:  new pulmonary consult wean steroids rae Stanton  July 31, 2018

## 2018-07-31 NOTE — PROGRESS NOTES
Rounded on patient, Pt.  Guide reviewed  and patient rights completed. Room safe and discharge survey mentioned. Expressed no other needs or concerns at this time.  Isauro Mccurdy 11:54 AM 7/31/2018

## 2018-07-31 NOTE — H&P
regular rate and rhythm, normal S1 and S2, no S3 or S4, and no murmur noted  ABDOMEN:  Soft, nontender. Normal bowel sounds  MUSCULOSKELETAL:  There is no redness, warmth, or swelling of the joints. Full range of motion noted. Motor strength is 5 out of 5 all extremities bilaterally. Tone is normal.  NEUROLOGIC:  Awake, alert, oriented to name, place and time. Cranial nerves II-XII are grossly intact. Motor is 5 out of 5 bilaterally. Cerebellar finger to nose, heel to shin intact. Sensory is intact. Babinski down going, Romberg negative, and gait is normal.  SKIN:  no bruising or bleeding    DATA:  CBC:   Lab Results   Component Value Date    WBC 7.9 07/30/2018    RBC 4.44 07/30/2018    HGB 13.6 07/30/2018    HCT 39.1 07/30/2018    MCV 88.1 07/30/2018    MCH 30.6 07/30/2018    MCHC 34.8 07/30/2018    RDW 12.7 07/30/2018     07/30/2018    MPV 10.0 07/30/2018     CMP:    Lab Results   Component Value Date     07/31/2018    K 4.3 07/31/2018     07/31/2018    CO2 23 07/31/2018    BUN 12 07/31/2018    CREATININE 0.8 07/31/2018    GFRAA >60 07/31/2018    LABGLOM >60 07/31/2018    GLUCOSE 143 07/31/2018    PROT 6.4 07/30/2018    LABALBU 4.2 07/30/2018    CALCIUM 9.8 07/31/2018    BILITOT 0.4 07/30/2018    ALKPHOS 59 07/30/2018    AST 17 07/30/2018    ALT 27 07/30/2018     CT CHEST WO CONTRAST       NUMBER OF IMAGES:  327       INDICATION:   persistent bronchospasm, cough        COMPARISON: Upright single view chest 1714 hours today and upright   single chest May 24, 2016       TECHNIQUE:     Axial images were obtained from the apices of the lungs through the   upper abdomen. Sagittal and coronal reconstructions performed for aid   in interpretation of the study. Low-dose CT  acquisition technique   included one of following options; 1 . Automated exposure control, 2. Adjustment of MA and or KV according to patient's size or 3.  Use of   iterative reconstruction.       FINDINGS:   Lung window images: There is no evidence of organized pneumonia,   effusion, or pulmonary mass lesion. Central airways are very slightly   thickened, which is a subjective and minimal finding that could   indicate airway inflammation, such as bronchitis.       Soft tissue window images: Clarissa Baez is no evidence of thoracic inlet or   axillary adenopathy. Mediastinal images show no evidence of   adenopathy, and normal appearance of the great vessels. The heart is   not enlarged.       Upper abdomen there may be a very small retrocardiac hiatus hernia,   and nocturnal reflux with very subtle aspiration can cause airway   inflammation. The liver, spleen, adrenals, gallbladder, pancreas, and   upper poles of the kidneys are unremarkable on this noncontrast study. The stomach is distended with ingested material.       Skeletal no traumatic findings are identified. Lower cervical fusion   with posterior fixation hardware is noted at the upper margin of the   study. Ventral hyperostosis is noted at the T11-12 articulation.           Impression       1. Subtle thickening of central airways could be a manifestation of   bronchitis/airway inflammation. 2. There may be a very small retrocardiac hiatus hernia, and nocturnal   reflux can result in subtle aspiration and bronchitis symptoms.    3. There is no evidence of chest wall or cardiovascular pathology, and   no evidence of organized pneumonia or effusion.           ASSESSMENT AND PLAN:    Persistent wheezing, better now  CT of the lungs as ordered by PCP as above  We'll ask pulmonary to see patient  he is being followed by pain clinic already  He told me he saw neurosurgery  Wean steroids per pulmonary  Pulmonary toilet per pulmonary

## 2018-08-01 LAB
L. PNEUMOPHILA SEROGP 1 UR AG: NORMAL
STREP PNEUMONIAE ANTIGEN, URINE: NORMAL

## 2018-08-01 PROCEDURE — 6360000002 HC RX W HCPCS: Performed by: INTERNAL MEDICINE

## 2018-08-01 PROCEDURE — 94640 AIRWAY INHALATION TREATMENT: CPT

## 2018-08-01 PROCEDURE — 6370000000 HC RX 637 (ALT 250 FOR IP): Performed by: INTERNAL MEDICINE

## 2018-08-01 PROCEDURE — 1200000000 HC SEMI PRIVATE

## 2018-08-01 RX ADMIN — BUDESONIDE 1000 MCG: 0.5 SUSPENSION RESPIRATORY (INHALATION) at 07:52

## 2018-08-01 RX ADMIN — METHYLPREDNISOLONE SODIUM SUCCINATE 40 MG: 40 INJECTION, POWDER, LYOPHILIZED, FOR SOLUTION INTRAMUSCULAR; INTRAVENOUS at 01:35

## 2018-08-01 RX ADMIN — IPRATROPIUM BROMIDE AND ALBUTEROL SULFATE 1 AMPULE: .5; 3 SOLUTION RESPIRATORY (INHALATION) at 15:52

## 2018-08-01 RX ADMIN — HYDROCODONE BITARTRATE AND ACETAMINOPHEN 1 TABLET: 5; 325 TABLET ORAL at 09:34

## 2018-08-01 RX ADMIN — HYDROCODONE BITARTRATE AND ACETAMINOPHEN 1 TABLET: 5; 325 TABLET ORAL at 01:35

## 2018-08-01 RX ADMIN — IPRATROPIUM BROMIDE AND ALBUTEROL SULFATE 1 AMPULE: .5; 3 SOLUTION RESPIRATORY (INHALATION) at 04:14

## 2018-08-01 RX ADMIN — HYDROCODONE BITARTRATE AND ACETAMINOPHEN 1 TABLET: 5; 325 TABLET ORAL at 13:45

## 2018-08-01 RX ADMIN — HYDROCODONE BITARTRATE AND ACETAMINOPHEN 1 TABLET: 5; 325 TABLET ORAL at 05:35

## 2018-08-01 RX ADMIN — HYDROCODONE BITARTRATE AND ACETAMINOPHEN 1 TABLET: 5; 325 TABLET ORAL at 17:48

## 2018-08-01 RX ADMIN — GABAPENTIN 300 MG: 300 CAPSULE ORAL at 13:45

## 2018-08-01 RX ADMIN — GABAPENTIN 300 MG: 300 CAPSULE ORAL at 08:25

## 2018-08-01 RX ADMIN — GABAPENTIN 300 MG: 300 CAPSULE ORAL at 20:49

## 2018-08-01 RX ADMIN — BUDESONIDE 1000 MCG: 0.5 SUSPENSION RESPIRATORY (INHALATION) at 19:21

## 2018-08-01 RX ADMIN — FORMOTEROL FUMARATE DIHYDRATE 20 MCG: 20 SOLUTION RESPIRATORY (INHALATION) at 19:21

## 2018-08-01 RX ADMIN — IPRATROPIUM BROMIDE AND ALBUTEROL SULFATE 1 AMPULE: .5; 3 SOLUTION RESPIRATORY (INHALATION) at 23:31

## 2018-08-01 RX ADMIN — FORMOTEROL FUMARATE DIHYDRATE 20 MCG: 20 SOLUTION RESPIRATORY (INHALATION) at 07:54

## 2018-08-01 RX ADMIN — QUETIAPINE FUMARATE 50 MG: 25 TABLET ORAL at 20:49

## 2018-08-01 RX ADMIN — IPRATROPIUM BROMIDE AND ALBUTEROL SULFATE 1 AMPULE: .5; 3 SOLUTION RESPIRATORY (INHALATION) at 11:51

## 2018-08-01 RX ADMIN — METHYLPREDNISOLONE SODIUM SUCCINATE 40 MG: 40 INJECTION, POWDER, LYOPHILIZED, FOR SOLUTION INTRAMUSCULAR; INTRAVENOUS at 08:25

## 2018-08-01 RX ADMIN — HYDROCODONE BITARTRATE AND ACETAMINOPHEN 1 TABLET: 5; 325 TABLET ORAL at 22:07

## 2018-08-01 RX ADMIN — IPRATROPIUM BROMIDE AND ALBUTEROL SULFATE 1 AMPULE: .5; 3 SOLUTION RESPIRATORY (INHALATION) at 00:15

## 2018-08-01 RX ADMIN — METHYLPREDNISOLONE SODIUM SUCCINATE 40 MG: 40 INJECTION, POWDER, LYOPHILIZED, FOR SOLUTION INTRAMUSCULAR; INTRAVENOUS at 16:14

## 2018-08-01 ASSESSMENT — PAIN DESCRIPTION - ORIENTATION
ORIENTATION: RIGHT;LEFT
ORIENTATION: RIGHT;LEFT

## 2018-08-01 ASSESSMENT — PAIN DESCRIPTION - PAIN TYPE
TYPE: CHRONIC PAIN
TYPE: CHRONIC PAIN
TYPE: ACUTE PAIN

## 2018-08-01 ASSESSMENT — PAIN SCALES - GENERAL
PAINLEVEL_OUTOF10: 8
PAINLEVEL_OUTOF10: 0
PAINLEVEL_OUTOF10: 5

## 2018-08-01 ASSESSMENT — PAIN DESCRIPTION - ONSET
ONSET: ON-GOING
ONSET: GRADUAL
ONSET: ON-GOING

## 2018-08-01 ASSESSMENT — PAIN DESCRIPTION - LOCATION
LOCATION: NECK;SHOULDER
LOCATION: NECK
LOCATION: NECK;SHOULDER

## 2018-08-01 ASSESSMENT — PAIN DESCRIPTION - DESCRIPTORS
DESCRIPTORS: CONSTANT;ACHING;DISCOMFORT
DESCRIPTORS: ACHING;THROBBING
DESCRIPTORS: ACHING;DISCOMFORT;SORE

## 2018-08-01 ASSESSMENT — PAIN DESCRIPTION - FREQUENCY
FREQUENCY: CONTINUOUS
FREQUENCY: INTERMITTENT
FREQUENCY: CONTINUOUS

## 2018-08-01 ASSESSMENT — PAIN DESCRIPTION - PROGRESSION
CLINICAL_PROGRESSION: NOT CHANGED
CLINICAL_PROGRESSION: NOT CHANGED
CLINICAL_PROGRESSION: GRADUALLY WORSENING

## 2018-08-01 NOTE — PROGRESS NOTES
[facility] Quality Flow/Interdisciplinary Rounds Progress Note        Quality Flow Rounds held on August 1, 2018    Disciplines Attending:  Bedside Nurse, ,  and Nursing Unit Leadership    Deidra Myrick was admitted on 7/30/2018  4:15 PM    Anticipated Discharge Date:  Expected Discharge Date: 08/02/18    Disposition:    Christopher Score:  Christopher Scale Score: 21    Readmission Risk              Risk of Unplanned Readmission:        9             Discussed patient goal for the day, patient clinical progression, and barriers to discharge.   The following Goal(s) of the Day/Commitment(s) have been identified:  smoking cessation education; monitor resp status      Sahil Burch  August 1, 2018

## 2018-08-01 NOTE — PROGRESS NOTES
0515   NA  141  142   K  3.2*  4.3   CL  105  106   CO2  25  23   BUN  14  12   CREATININE  0.9  0.8     LIVER PROFILE:   Recent Labs      07/30/18   1700   AST  17   ALT  27   BILITOT  0.4   ALKPHOS  59     PT/INR: No results for input(s): PROTIME, INR in the last 72 hours. APTT: No results for input(s): APTT in the last 72 hours. Pathology:  1. N/A      Microbiology:  1. None    Recent ABG:   No results for input(s): PH, PO2, PCO2, HCO3, BE, O2SAT, METHB, O2HB, COHB, O2CON, HHB, THB in the last 72 hours. Recent Films:  CT Chest WO Contrast   Final Result      1. Subtle thickening of central airways could be a manifestation of   bronchitis/airway inflammation. 2. There may be a very small retrocardiac hiatus hernia, and nocturnal   reflux can result in subtle aspiration and bronchitis symptoms. 3. There is no evidence of chest wall or cardiovascular pathology, and   no evidence of organized pneumonia or effusion. XR CHEST PORTABLE   Final Result   No radiographic evidence of acute cardiopulmonary disease. Postoperative changes as above. Assessment:  1. Bronchospasm: Acute. Underlying asthma is not excluded. Unfortunately, because a differential count was never sent, atopy cannot be excluded nor can eosinophilia. The chest CT shows modest airway thickening but otherwise no significant pathology. The d-dimer being less than 200 virtually excludes pulmonary embolism in the differential diagnosis. There has been little improvement since yesterday        Plan:  1. Given the lack of progress seen, increase steroids. Care reviewed with the staff and the patient's family as available. Please note that voice recognition technology was used in the preparation of this note and make therefore it may contain inadvertent transcription errors. Alice Benson M.D., F.C.C.P.     [facility] and [address] [facility], [address] [facility], [address], [facility]

## 2018-08-01 NOTE — PLAN OF CARE
Problem: Breathing Pattern - Ineffective:  Goal: Ability to achieve and maintain a regular respiratory rate will improve  Ability to achieve and maintain a regular respiratory rate will improve   Outcome: Ongoing      Problem: Pain:  Goal: Pain level will decrease  Pain level will decrease   Outcome: Ongoing

## 2018-08-02 LAB
FILM ARRAY ADENOVIRUS: NORMAL
FILM ARRAY BORDETELLA PERTUSSIS: NORMAL
FILM ARRAY CHLAMYDOPHILIA PNEUMONIAE: NORMAL
FILM ARRAY CORONAVIRUS 229E: NORMAL
FILM ARRAY CORONAVIRUS HKU1: NORMAL
FILM ARRAY CORONAVIRUS NL63: NORMAL
FILM ARRAY CORONAVIRUS OC43: NORMAL
FILM ARRAY INFLUENZA A VIRUS 09H1: NORMAL
FILM ARRAY INFLUENZA A VIRUS H1: NORMAL
FILM ARRAY INFLUENZA A VIRUS H3: NORMAL
FILM ARRAY INFLUENZA A VIRUS: NORMAL
FILM ARRAY INFLUENZA B: NORMAL
FILM ARRAY METAPNEUMOVIRUS: NORMAL
FILM ARRAY MYCOPLASMA PNEUMONIAE: NORMAL
FILM ARRAY PARAINFLUENZA VIRUS 1: NORMAL
FILM ARRAY PARAINFLUENZA VIRUS 2: NORMAL
FILM ARRAY PARAINFLUENZA VIRUS 3: NORMAL
FILM ARRAY PARAINFLUENZA VIRUS 4: NORMAL
FILM ARRAY RESPIRATORY SYNCITIAL VIRUS: NORMAL
FILM ARRAY RHINOVIRUS/ENTEROVIRUS: NORMAL

## 2018-08-02 PROCEDURE — 87581 M.PNEUMON DNA AMP PROBE: CPT

## 2018-08-02 PROCEDURE — 87798 DETECT AGENT NOS DNA AMP: CPT

## 2018-08-02 PROCEDURE — 94664 DEMO&/EVAL PT USE INHALER: CPT

## 2018-08-02 PROCEDURE — 87502 INFLUENZA DNA AMP PROBE: CPT

## 2018-08-02 PROCEDURE — 6360000002 HC RX W HCPCS: Performed by: INTERNAL MEDICINE

## 2018-08-02 PROCEDURE — 1200000000 HC SEMI PRIVATE

## 2018-08-02 PROCEDURE — 2580000003 HC RX 258: Performed by: INTERNAL MEDICINE

## 2018-08-02 PROCEDURE — 6370000000 HC RX 637 (ALT 250 FOR IP): Performed by: INTERNAL MEDICINE

## 2018-08-02 PROCEDURE — 87486 CHLMYD PNEUM DNA AMP PROBE: CPT

## 2018-08-02 PROCEDURE — 94640 AIRWAY INHALATION TREATMENT: CPT

## 2018-08-02 PROCEDURE — 87503 INFLUENZA DNA AMP PROB ADDL: CPT

## 2018-08-02 RX ORDER — SODIUM CHLORIDE 0.9 % (FLUSH) 0.9 %
10 SYRINGE (ML) INJECTION 2 TIMES DAILY
Status: DISCONTINUED | OUTPATIENT
Start: 2018-08-02 | End: 2018-08-06 | Stop reason: HOSPADM

## 2018-08-02 RX ADMIN — Medication 10 ML: at 10:10

## 2018-08-02 RX ADMIN — HYDROCODONE BITARTRATE AND ACETAMINOPHEN 1 TABLET: 5; 325 TABLET ORAL at 20:53

## 2018-08-02 RX ADMIN — FORMOTEROL FUMARATE DIHYDRATE 20 MCG: 20 SOLUTION RESPIRATORY (INHALATION) at 20:23

## 2018-08-02 RX ADMIN — HYDROCODONE BITARTRATE AND ACETAMINOPHEN 1 TABLET: 5; 325 TABLET ORAL at 07:42

## 2018-08-02 RX ADMIN — GABAPENTIN 300 MG: 300 CAPSULE ORAL at 20:16

## 2018-08-02 RX ADMIN — Medication 10 ML: at 20:16

## 2018-08-02 RX ADMIN — BUDESONIDE 1000 MCG: 0.5 SUSPENSION RESPIRATORY (INHALATION) at 20:23

## 2018-08-02 RX ADMIN — FORMOTEROL FUMARATE DIHYDRATE 20 MCG: 20 SOLUTION RESPIRATORY (INHALATION) at 08:05

## 2018-08-02 RX ADMIN — HYDROCODONE BITARTRATE AND ACETAMINOPHEN 1 TABLET: 5; 325 TABLET ORAL at 02:54

## 2018-08-02 RX ADMIN — GABAPENTIN 300 MG: 300 CAPSULE ORAL at 10:10

## 2018-08-02 RX ADMIN — HYDROCODONE BITARTRATE AND ACETAMINOPHEN 1 TABLET: 5; 325 TABLET ORAL at 16:51

## 2018-08-02 RX ADMIN — METHYLPREDNISOLONE SODIUM SUCCINATE 40 MG: 40 INJECTION, POWDER, LYOPHILIZED, FOR SOLUTION INTRAMUSCULAR; INTRAVENOUS at 16:51

## 2018-08-02 RX ADMIN — IPRATROPIUM BROMIDE AND ALBUTEROL SULFATE 1 AMPULE: .5; 3 SOLUTION RESPIRATORY (INHALATION) at 11:58

## 2018-08-02 RX ADMIN — METHYLPREDNISOLONE SODIUM SUCCINATE 40 MG: 40 INJECTION, POWDER, LYOPHILIZED, FOR SOLUTION INTRAMUSCULAR; INTRAVENOUS at 02:03

## 2018-08-02 RX ADMIN — METHYLPREDNISOLONE SODIUM SUCCINATE 40 MG: 40 INJECTION, POWDER, LYOPHILIZED, FOR SOLUTION INTRAMUSCULAR; INTRAVENOUS at 10:10

## 2018-08-02 RX ADMIN — HYDROCODONE BITARTRATE AND ACETAMINOPHEN 1 TABLET: 5; 325 TABLET ORAL at 12:13

## 2018-08-02 RX ADMIN — QUETIAPINE FUMARATE 50 MG: 25 TABLET ORAL at 20:15

## 2018-08-02 RX ADMIN — IPRATROPIUM BROMIDE AND ALBUTEROL SULFATE 1 AMPULE: .5; 3 SOLUTION RESPIRATORY (INHALATION) at 03:02

## 2018-08-02 RX ADMIN — IPRATROPIUM BROMIDE AND ALBUTEROL SULFATE 1 AMPULE: .5; 3 SOLUTION RESPIRATORY (INHALATION) at 16:03

## 2018-08-02 RX ADMIN — BUDESONIDE 1000 MCG: 0.5 SUSPENSION RESPIRATORY (INHALATION) at 08:05

## 2018-08-02 RX ADMIN — Medication 10 ML: at 02:04

## 2018-08-02 RX ADMIN — GABAPENTIN 300 MG: 300 CAPSULE ORAL at 14:24

## 2018-08-02 ASSESSMENT — PAIN DESCRIPTION - FREQUENCY
FREQUENCY: INTERMITTENT

## 2018-08-02 ASSESSMENT — PAIN DESCRIPTION - LOCATION
LOCATION: NECK
LOCATION: NECK;SHOULDER
LOCATION: NECK

## 2018-08-02 ASSESSMENT — PAIN DESCRIPTION - ONSET
ONSET: ON-GOING

## 2018-08-02 ASSESSMENT — PAIN DESCRIPTION - PROGRESSION
CLINICAL_PROGRESSION: NOT CHANGED

## 2018-08-02 ASSESSMENT — PAIN SCALES - GENERAL
PAINLEVEL_OUTOF10: 8
PAINLEVEL_OUTOF10: 5
PAINLEVEL_OUTOF10: 5
PAINLEVEL_OUTOF10: 7
PAINLEVEL_OUTOF10: 0
PAINLEVEL_OUTOF10: 6
PAINLEVEL_OUTOF10: 7
PAINLEVEL_OUTOF10: 8
PAINLEVEL_OUTOF10: 6

## 2018-08-02 ASSESSMENT — PAIN DESCRIPTION - DESCRIPTORS
DESCRIPTORS: ACHING;DISCOMFORT
DESCRIPTORS: ACHING;DISCOMFORT;SORE
DESCRIPTORS: ACHING;DISCOMFORT;SORE
DESCRIPTORS: ACHING;DISCOMFORT;PINS AND NEEDLES

## 2018-08-02 ASSESSMENT — PAIN DESCRIPTION - PAIN TYPE
TYPE: ACUTE PAIN
TYPE: CHRONIC PAIN
TYPE: ACUTE PAIN
TYPE: CHRONIC PAIN
TYPE: CHRONIC PAIN
TYPE: ACUTE PAIN
TYPE: ACUTE PAIN

## 2018-08-02 ASSESSMENT — PAIN DESCRIPTION - ORIENTATION
ORIENTATION: RIGHT;LEFT

## 2018-08-02 NOTE — PROGRESS NOTES
Pulmonary Progress Note    Admit Date: 2018  Hospital day                               PCP: Tony Evans MD    Chief Complaint (s):  Patient Active Problem List   Diagnosis    Neck pain    Pure hypercholesterolemia    Vasectomy evaluation    Intractable pain    Neck pain    Bronchospasm       Subjective:  - Minimally better. Still with loud wheezing and diffuse rhonchi      Vitals:  VITALS:  /85   Pulse 94   Temp 97.8 °F (36.6 °C) (Oral)   Resp 18   Ht 5' 8\" (1.727 m)   Wt 148 lb 6 oz (67.3 kg)   SpO2 97%   BMI 22.56 kg/m²     24HR INTAKE/OUTPUT:    No intake or output data in the 24 hours ending 18 1118    24HR PULSE OXIMETRY RANGE:    SpO2  Av %  Min: 93 %  Max: 97 %    Medications:  IV:      Scheduled Meds:   sodium chloride flush  10 mL Intravenous BID    ipratropium-albuterol  1 ampule Inhalation Q4H    budesonide  1,000 mcg Nebulization BID    formoterol  20 mcg Nebulization BID    gabapentin  300 mg Oral TID    QUEtiapine  50 mg Oral Nightly    enoxaparin  40 mg Subcutaneous Daily    methylPREDNISolone  40 mg Intravenous Q8H       Diet:   DIET GENERAL;     EXAM:  General: No distress. Alert. Eyes: PERRL. No sclera icterus. No conjunctival injection. ENT: No discharge. Pharynx clear. Neck: Trachea midline. Normal thyroid. Resp: No accessory muscle use. No rales. Diffuse wheezing. Scattered rhonchi. CV: Regular rate. Regular rhythm. No murmur or rub. Abd: Non-tender. Non-distended. No masses. No organomegaly. Normal bowel sounds. Skin: Warm and dry. No nodule on exposed extremities. No rash on exposed extremities. Ext: No cyanosis, clubbing, edema  Lymph: No cervical LAD. No supraclavicular LAD. M/S: No cyanosis. No joint deformity. No clubbing. Neuro: Awake. Follows commands. Positive pupils/gag/corneals. Normal pain response.        Results:  CBC:   Recent Labs      18   1700   WBC  7.9   HGB  13.6   HCT  39.1   MCV  88.1

## 2018-08-02 NOTE — PATIENT CARE CONFERENCE
P Quality Flow/Interdisciplinary Rounds Progress Note        Quality Flow Rounds held on August 2, 2018    Disciplines Attending:  Bedside Nurse, ,  and Nursing Unit Leadership    Katiana Kerr was admitted on 7/30/2018  4:15 PM    Anticipated Discharge Date:  Expected Discharge Date: 08/02/18    Disposition:    Christopher Score:  Christopher Scale Score: 22    Readmission Score:         Discussed patient goal for the day, patient clinical progression, and barriers to discharge.   The following Goal(s) of the Day/Commitment(s) have been identified:  IPPB PFT, repeat respiratory panel    Morro Collier  August 2, 2018

## 2018-08-02 NOTE — PLAN OF CARE
Problem: Breathing Pattern - Ineffective:  Goal: Ability to achieve and maintain a regular respiratory rate will improve  Ability to achieve and maintain a regular respiratory rate will improve   Outcome: Met This Shift      Problem: Pain:  Goal: Control of acute pain  Control of acute pain   Outcome: Met This Shift

## 2018-08-02 NOTE — PROGRESS NOTES
Pt seen/ examined. ROS x 10 neg. Except: seems extremely nervous. Chart reviewed. meds reviewed. D/w nursing + family as available. EXAM:  /85   Pulse 94   Temp 97.8 °F (36.6 °C) (Oral)   Resp 18   Ht 5' 8\" (1.727 m)   Wt 148 lb 6 oz (67.3 kg)   SpO2 97%   BMI 22.56 kg/m²   GEN: A+O NAD. HEENT:NCAT. EOMI. MIRYAM   NECK:  No JVD. No bruits. Trach midline. No thyromegaly. LUNGS: CTA w/o rales, rhonchi, wheezes. Good excursion. CV: rrr w/o mrg  ABD: soft, non tender. Nl BS. No organomegaly. EXT: no clubbing, cyanosis, edema.   NEURO:   Labs/data reviewed  LABS: CBC:   Lab Results   Component Value Date    WBC 7.9 07/30/2018    RBC 4.44 07/30/2018    HGB 13.6 07/30/2018    HCT 39.1 07/30/2018    MCV 88.1 07/30/2018    MCH 30.6 07/30/2018    MCHC 34.8 07/30/2018    RDW 12.7 07/30/2018     07/30/2018    MPV 10.0 07/30/2018     CMP:    Lab Results   Component Value Date     07/31/2018    K 4.3 07/31/2018     07/31/2018    CO2 23 07/31/2018    BUN 12 07/31/2018    CREATININE 0.8 07/31/2018    GFRAA >60 07/31/2018    LABGLOM >60 07/31/2018    GLUCOSE 143 07/31/2018    PROT 6.4 07/30/2018    LABALBU 4.2 07/30/2018    CALCIUM 9.8 07/31/2018    BILITOT 0.4 07/30/2018    ALKPHOS 59 07/30/2018    AST 17 07/30/2018    ALT 27 07/30/2018       Current Facility-Administered Medications:     sodium chloride flush 0.9 % injection 10 mL, 10 mL, Intravenous, BID, Frances Morel MD, 10 mL at 08/02/18 0204    ipratropium-albuterol (DUONEB) nebulizer solution 1 ampule, 1 ampule, Inhalation, Q4H, Samantha Cornell MD, 1 ampule at 08/02/18 0302    budesonide (PULMICORT) nebulizer suspension 1,000 mcg, 1,000 mcg, Nebulization, BID, Samantha Cornell MD, 1,000 mcg at 08/01/18 1921    formoterol (PERFOROMIST) nebulizer solution 20 mcg, 20 mcg, Nebulization, BID, Samantha Cornell MD, 20 mcg at 08/01/18 1921    gabapentin (NEURONTIN) capsule 300 mg, 300 mg, Oral, TID, Frances Morel MD, 300 mg at 08/01/18 2049    HYDROcodone-acetaminophen (NORCO) 5-325 MG per tablet 1 tablet, 1 tablet, Oral, Q4H PRN, Rod Ludwig MD, 1 tablet at 08/02/18 0742    QUEtiapine (SEROQUEL) tablet 50 mg, 50 mg, Oral, Nightly, Frances Morel MD, 50 mg at 08/01/18 2049    enoxaparin (LOVENOX) injection 40 mg, 40 mg, Subcutaneous, Daily, Frances Morel MD    methylPREDNISolone sodium (SOLU-MEDROL) injection 40 mg, 40 mg, Intravenous, Q8H, Frances Morel MD, 40 mg at 08/02/18 0203    A/P:      Patient Active Problem List   Diagnosis    Neck pain    Pure hypercholesterolemia    Vasectomy evaluation    Intractable pain    Neck pain    Bronchospasm        PLAN:consider PFT before and after

## 2018-08-03 LAB — IGE: 120 KU/L

## 2018-08-03 PROCEDURE — 94640 AIRWAY INHALATION TREATMENT: CPT

## 2018-08-03 PROCEDURE — 6370000000 HC RX 637 (ALT 250 FOR IP): Performed by: INTERNAL MEDICINE

## 2018-08-03 PROCEDURE — 2580000003 HC RX 258: Performed by: INTERNAL MEDICINE

## 2018-08-03 PROCEDURE — 1200000000 HC SEMI PRIVATE

## 2018-08-03 PROCEDURE — 6360000002 HC RX W HCPCS: Performed by: INTERNAL MEDICINE

## 2018-08-03 RX ADMIN — QUETIAPINE FUMARATE 50 MG: 25 TABLET ORAL at 20:53

## 2018-08-03 RX ADMIN — GABAPENTIN 300 MG: 300 CAPSULE ORAL at 13:53

## 2018-08-03 RX ADMIN — Medication 10 ML: at 20:56

## 2018-08-03 RX ADMIN — BUDESONIDE 1000 MCG: 0.5 SUSPENSION RESPIRATORY (INHALATION) at 20:27

## 2018-08-03 RX ADMIN — METHYLPREDNISOLONE SODIUM SUCCINATE 40 MG: 40 INJECTION, POWDER, LYOPHILIZED, FOR SOLUTION INTRAMUSCULAR; INTRAVENOUS at 09:43

## 2018-08-03 RX ADMIN — GABAPENTIN 300 MG: 300 CAPSULE ORAL at 20:54

## 2018-08-03 RX ADMIN — FORMOTEROL FUMARATE DIHYDRATE 20 MCG: 20 SOLUTION RESPIRATORY (INHALATION) at 20:27

## 2018-08-03 RX ADMIN — IPRATROPIUM BROMIDE AND ALBUTEROL SULFATE 1 AMPULE: .5; 3 SOLUTION RESPIRATORY (INHALATION) at 11:31

## 2018-08-03 RX ADMIN — METHYLPREDNISOLONE SODIUM SUCCINATE 40 MG: 40 INJECTION, POWDER, LYOPHILIZED, FOR SOLUTION INTRAMUSCULAR; INTRAVENOUS at 16:24

## 2018-08-03 RX ADMIN — BUDESONIDE 1000 MCG: 0.5 SUSPENSION RESPIRATORY (INHALATION) at 07:49

## 2018-08-03 RX ADMIN — METHYLPREDNISOLONE SODIUM SUCCINATE 40 MG: 40 INJECTION, POWDER, LYOPHILIZED, FOR SOLUTION INTRAMUSCULAR; INTRAVENOUS at 01:16

## 2018-08-03 RX ADMIN — HYDROCODONE BITARTRATE AND ACETAMINOPHEN 1 TABLET: 5; 325 TABLET ORAL at 04:14

## 2018-08-03 RX ADMIN — HYDROCODONE BITARTRATE AND ACETAMINOPHEN 1 TABLET: 5; 325 TABLET ORAL at 12:36

## 2018-08-03 RX ADMIN — HYDROCODONE BITARTRATE AND ACETAMINOPHEN 1 TABLET: 5; 325 TABLET ORAL at 17:09

## 2018-08-03 RX ADMIN — FORMOTEROL FUMARATE DIHYDRATE 20 MCG: 20 SOLUTION RESPIRATORY (INHALATION) at 07:49

## 2018-08-03 RX ADMIN — IPRATROPIUM BROMIDE AND ALBUTEROL SULFATE 1 AMPULE: .5; 3 SOLUTION RESPIRATORY (INHALATION) at 03:58

## 2018-08-03 RX ADMIN — Medication 10 ML: at 09:43

## 2018-08-03 RX ADMIN — IPRATROPIUM BROMIDE AND ALBUTEROL SULFATE 1 AMPULE: .5; 3 SOLUTION RESPIRATORY (INHALATION) at 15:25

## 2018-08-03 RX ADMIN — IPRATROPIUM BROMIDE AND ALBUTEROL SULFATE 1 AMPULE: .5; 3 SOLUTION RESPIRATORY (INHALATION) at 00:15

## 2018-08-03 RX ADMIN — GABAPENTIN 300 MG: 300 CAPSULE ORAL at 09:42

## 2018-08-03 RX ADMIN — HYDROCODONE BITARTRATE AND ACETAMINOPHEN 1 TABLET: 5; 325 TABLET ORAL at 08:14

## 2018-08-03 RX ADMIN — HYDROCODONE BITARTRATE AND ACETAMINOPHEN 1 TABLET: 5; 325 TABLET ORAL at 22:56

## 2018-08-03 ASSESSMENT — PAIN DESCRIPTION - LOCATION
LOCATION: NECK

## 2018-08-03 ASSESSMENT — PAIN DESCRIPTION - ORIENTATION
ORIENTATION: RIGHT;LEFT

## 2018-08-03 ASSESSMENT — PAIN DESCRIPTION - PROGRESSION
CLINICAL_PROGRESSION: NOT CHANGED

## 2018-08-03 ASSESSMENT — PAIN SCALES - GENERAL
PAINLEVEL_OUTOF10: 8
PAINLEVEL_OUTOF10: 6
PAINLEVEL_OUTOF10: 8
PAINLEVEL_OUTOF10: 8
PAINLEVEL_OUTOF10: 6
PAINLEVEL_OUTOF10: 6
PAINLEVEL_OUTOF10: 0
PAINLEVEL_OUTOF10: 8
PAINLEVEL_OUTOF10: 8

## 2018-08-03 ASSESSMENT — PAIN DESCRIPTION - PAIN TYPE
TYPE: CHRONIC PAIN
TYPE: ACUTE PAIN
TYPE: CHRONIC PAIN
TYPE: CHRONIC PAIN

## 2018-08-03 ASSESSMENT — PAIN DESCRIPTION - ONSET
ONSET: ON-GOING

## 2018-08-03 ASSESSMENT — PAIN DESCRIPTION - DESCRIPTORS
DESCRIPTORS: ACHING;DISCOMFORT
DESCRIPTORS: ACHING;DISCOMFORT;SORE
DESCRIPTORS: ACHING;DISCOMFORT
DESCRIPTORS: ACHING;DISCOMFORT

## 2018-08-03 ASSESSMENT — PAIN DESCRIPTION - FREQUENCY
FREQUENCY: INTERMITTENT

## 2018-08-03 NOTE — PROGRESS NOTES
Pt seen/ examined. ROS x 10 neg. Except: he feels very tired. Gets short of breath when he walks  Chart reviewed. meds reviewed. D/w nursing + family as available. EXAM:  BP (!) 140/81   Pulse 94   Temp 98.3 °F (36.8 °C) (Oral)   Resp 18   Ht 5' 8\" (1.727 m)   Wt 156 lb 9.6 oz (71 kg)   SpO2 95%   BMI 23.81 kg/m²   GEN: A+O NAD. HEENT:NCAT. EOMI. MIRYAM   NECK:  No JVD. No bruits. Trach midline. No thyromegaly. LUNGS: rhonchi, wheezes. Good excursion. CV: rrr w/o mrg  ABD: soft, non tender. Nl BS. No organomegaly. EXT: no clubbing, cyanosis, edema.   NEURO:   Labs/data reviewed  LABS: CBC:   Lab Results   Component Value Date    WBC 7.9 07/30/2018    RBC 4.44 07/30/2018    HGB 13.6 07/30/2018    HCT 39.1 07/30/2018    MCV 88.1 07/30/2018    MCH 30.6 07/30/2018    MCHC 34.8 07/30/2018    RDW 12.7 07/30/2018     07/30/2018    MPV 10.0 07/30/2018     CMP:    Lab Results   Component Value Date     07/31/2018    K 4.3 07/31/2018     07/31/2018    CO2 23 07/31/2018    BUN 12 07/31/2018    CREATININE 0.8 07/31/2018    GFRAA >60 07/31/2018    LABGLOM >60 07/31/2018    GLUCOSE 143 07/31/2018    PROT 6.4 07/30/2018    LABALBU 4.2 07/30/2018    CALCIUM 9.8 07/31/2018    BILITOT 0.4 07/30/2018    ALKPHOS 59 07/30/2018    AST 17 07/30/2018    ALT 27 07/30/2018       Current Facility-Administered Medications:     sodium chloride flush 0.9 % injection 10 mL, 10 mL, Intravenous, BID, Frances Morel MD, 10 mL at 08/02/18 2016    ipratropium-albuterol (DUONEB) nebulizer solution 1 ampule, 1 ampule, Inhalation, Q4H, Porfirio Mcleod MD, 1 ampule at 08/03/18 0358    budesonide (PULMICORT) nebulizer suspension 1,000 mcg, 1,000 mcg, Nebulization, BID, Porfirio Mcleod MD, 1,000 mcg at 08/02/18 2023    formoterol (PERFOROMIST) nebulizer solution 20 mcg, 20 mcg, Nebulization, BID, Porfirio Mcleod MD, 20 mcg at 08/02/18 2023    gabapentin (NEURONTIN) capsule 300 mg, 300 mg, Oral, TID, Javiero MD Maria Teresa, 300 mg at 08/02/18 2016    HYDROcodone-acetaminophen (NORCO) 5-325 MG per tablet 1 tablet, 1 tablet, Oral, Q4H PRN, Yamileth Holguin MD, 1 tablet at 08/03/18 0414    QUEtiapine (SEROQUEL) tablet 50 mg, 50 mg, Oral, Nightly, Frances Morel MD, 50 mg at 08/02/18 2015    enoxaparin (LOVENOX) injection 40 mg, 40 mg, Subcutaneous, Daily, Frances Morel MD    methylPREDNISolone sodium (SOLU-MEDROL) injection 40 mg, 40 mg, Intravenous, Q8H, Frances Morel MD, 40 mg at 08/03/18 0116    A/P:      Patient Active Problem List   Diagnosis    Neck pain    Pure hypercholesterolemia    Vasectomy evaluation    Intractable pain    Neck pain    Bronchospasm        PLAN:Pulm toilet per pulmonary  Still has a lot of wheezing on exam.  The IP PB made him worse

## 2018-08-03 NOTE — PROGRESS NOTES
Pulmonary Progress Note    Admit Date: 2018  Hospital day                               PCP: Karin Rosa MD    Chief Complaint (s):  Patient Active Problem List   Diagnosis    Neck pain    Pure hypercholesterolemia    Vasectomy evaluation    Intractable pain    Neck pain    Bronchospasm       Subjective:  - Somewhat better today, reading easier. Not a big fan of [email or web address] however. Vitals:  VITALS:  /89   Pulse 92   Temp 98 °F (36.7 °C) (Oral)   Resp 18   Ht 5' 8\" (1.727 m)   Wt 156 lb 9.6 oz (71 kg)   SpO2 92%   BMI 23.81 kg/m²     24HR INTAKE/OUTPUT:      Intake/Output Summary (Last 24 hours) at 18 1135  Last data filed at 18 1250   Gross per 24 hour   Intake              360 ml   Output                0 ml   Net              360 ml       24HR PULSE OXIMETRY RANGE:    SpO2  Av.5 %  Min: 92 %  Max: 95 %    Medications:  IV:      Scheduled Meds:   sodium chloride flush  10 mL Intravenous BID    ipratropium-albuterol  1 ampule Inhalation Q4H    budesonide  1,000 mcg Nebulization BID    formoterol  20 mcg Nebulization BID    gabapentin  300 mg Oral TID    QUEtiapine  50 mg Oral Nightly    enoxaparin  40 mg Subcutaneous Daily    methylPREDNISolone  40 mg Intravenous Q8H       Diet:   DIET GENERAL;     EXAM:  General: No distress. Alert. Eyes: PERRL. No sclera icterus. No conjunctival injection. ENT: No discharge. Pharynx clear. Neck: Trachea midline. Normal thyroid. Resp: No accessory muscle use. No rales. Scattered wheezing. Few rhonchi. CV: Regular rate. Regular rhythm. No murmur or rub. Abd: Non-tender. Non-distended. No masses. No organomegaly. Normal bowel sounds. Skin: Warm and dry. No nodule on exposed extremities. No rash on exposed extremities. Ext: No cyanosis, clubbing, edema  Lymph: No cervical LAD. No supraclavicular LAD. M/S: No cyanosis. No joint deformity. No clubbing. Neuro: Awake. Follows commands.  Positive pupils/gag/corneals. Normal pain response. Results:  CBC:   No results for input(s): WBC, HGB, HCT, MCV, PLT in the last 72 hours. BMP:   No results for input(s): NA, K, CL, CO2, PHOS, BUN, CREATININE in the last 72 hours. Invalid input(s): CA  LIVER PROFILE:   No results for input(s): AST, ALT, LIPASE, BILIDIR, BILITOT, ALKPHOS in the last 72 hours. Invalid input(s): AMYLASE,  ALB  PT/INR: No results for input(s): PROTIME, INR in the last 72 hours. APTT: No results for input(s): APTT in the last 72 hours. Pathology:  1. N/A      Microbiology:  1. None    Recent ABG:   No results for input(s): PH, PO2, PCO2, HCO3, BE, O2SAT, METHB, O2HB, COHB, O2CON, HHB, THB in the last 72 hours. Recent Films:  CT Chest WO Contrast   Final Result      1. Subtle thickening of central airways could be a manifestation of   bronchitis/airway inflammation. 2. There may be a very small retrocardiac hiatus hernia, and nocturnal   reflux can result in subtle aspiration and bronchitis symptoms. 3. There is no evidence of chest wall or cardiovascular pathology, and   no evidence of organized pneumonia or effusion. XR CHEST PORTABLE   Final Result   No radiographic evidence of acute cardiopulmonary disease. Postoperative changes as above. Assessment:  1. Bronchospasm: Acute. Underlying asthma is not excluded. Unfortunately, because a differential count was never sent, atopy cannot be excluded nor can eosinophilia. The chest CT shows modest airway thickening but otherwise no significant pathology. The d-dimer being less than 200 virtually excludes pulmonary embolism in the differential diagnosis. There has been little improvement since yesterday        Plan:  1. Continue current care. 2. Anticipate discharge in 24 hours. Care reviewed with the staff and the patient's family as available.      Please note that voice recognition technology was used in the preparation of

## 2018-08-03 NOTE — PLAN OF CARE
Problem: Breathing Pattern - Ineffective:  Goal: Ability to achieve and maintain a regular respiratory rate will improve  Ability to achieve and maintain a regular respiratory rate will improve   Outcome: Met This Shift      Problem: Pain:  Goal: Control of chronic pain  Control of chronic pain   Outcome: Met This Shift

## 2018-08-04 PROCEDURE — 94640 AIRWAY INHALATION TREATMENT: CPT

## 2018-08-04 PROCEDURE — 6360000002 HC RX W HCPCS: Performed by: INTERNAL MEDICINE

## 2018-08-04 PROCEDURE — 1200000000 HC SEMI PRIVATE

## 2018-08-04 PROCEDURE — 6370000000 HC RX 637 (ALT 250 FOR IP): Performed by: INTERNAL MEDICINE

## 2018-08-04 PROCEDURE — 2580000003 HC RX 258: Performed by: INTERNAL MEDICINE

## 2018-08-04 RX ORDER — PREDNISONE 20 MG/1
40 TABLET ORAL DAILY
Status: DISCONTINUED | OUTPATIENT
Start: 2018-08-05 | End: 2018-08-06 | Stop reason: HOSPADM

## 2018-08-04 RX ADMIN — BUDESONIDE 1000 MCG: 0.5 SUSPENSION RESPIRATORY (INHALATION) at 19:54

## 2018-08-04 RX ADMIN — IPRATROPIUM BROMIDE AND ALBUTEROL SULFATE 1 AMPULE: .5; 3 SOLUTION RESPIRATORY (INHALATION) at 16:02

## 2018-08-04 RX ADMIN — IPRATROPIUM BROMIDE AND ALBUTEROL SULFATE 1 AMPULE: .5; 3 SOLUTION RESPIRATORY (INHALATION) at 12:47

## 2018-08-04 RX ADMIN — IPRATROPIUM BROMIDE AND ALBUTEROL SULFATE 1 AMPULE: .5; 3 SOLUTION RESPIRATORY (INHALATION) at 04:25

## 2018-08-04 RX ADMIN — GABAPENTIN 300 MG: 300 CAPSULE ORAL at 09:21

## 2018-08-04 RX ADMIN — Medication 10 ML: at 09:22

## 2018-08-04 RX ADMIN — HYDROCODONE BITARTRATE AND ACETAMINOPHEN 1 TABLET: 5; 325 TABLET ORAL at 13:45

## 2018-08-04 RX ADMIN — BUDESONIDE 1000 MCG: 0.5 SUSPENSION RESPIRATORY (INHALATION) at 09:03

## 2018-08-04 RX ADMIN — GABAPENTIN 300 MG: 300 CAPSULE ORAL at 13:45

## 2018-08-04 RX ADMIN — GABAPENTIN 300 MG: 300 CAPSULE ORAL at 21:06

## 2018-08-04 RX ADMIN — HYDROCODONE BITARTRATE AND ACETAMINOPHEN 1 TABLET: 5; 325 TABLET ORAL at 17:59

## 2018-08-04 RX ADMIN — METHYLPREDNISOLONE SODIUM SUCCINATE 40 MG: 40 INJECTION, POWDER, LYOPHILIZED, FOR SOLUTION INTRAMUSCULAR; INTRAVENOUS at 18:00

## 2018-08-04 RX ADMIN — FORMOTEROL FUMARATE DIHYDRATE 20 MCG: 20 SOLUTION RESPIRATORY (INHALATION) at 09:03

## 2018-08-04 RX ADMIN — METHYLPREDNISOLONE SODIUM SUCCINATE 40 MG: 40 INJECTION, POWDER, LYOPHILIZED, FOR SOLUTION INTRAMUSCULAR; INTRAVENOUS at 09:21

## 2018-08-04 RX ADMIN — METHYLPREDNISOLONE SODIUM SUCCINATE 40 MG: 40 INJECTION, POWDER, LYOPHILIZED, FOR SOLUTION INTRAMUSCULAR; INTRAVENOUS at 01:14

## 2018-08-04 RX ADMIN — Medication 10 ML: at 21:07

## 2018-08-04 RX ADMIN — QUETIAPINE FUMARATE 50 MG: 25 TABLET ORAL at 21:06

## 2018-08-04 RX ADMIN — IPRATROPIUM BROMIDE AND ALBUTEROL SULFATE 1 AMPULE: .5; 3 SOLUTION RESPIRATORY (INHALATION) at 00:07

## 2018-08-04 RX ADMIN — HYDROCODONE BITARTRATE AND ACETAMINOPHEN 1 TABLET: 5; 325 TABLET ORAL at 09:27

## 2018-08-04 RX ADMIN — HYDROCODONE BITARTRATE AND ACETAMINOPHEN 1 TABLET: 5; 325 TABLET ORAL at 04:56

## 2018-08-04 RX ADMIN — IPRATROPIUM BROMIDE AND ALBUTEROL SULFATE 1 AMPULE: .5; 3 SOLUTION RESPIRATORY (INHALATION) at 23:48

## 2018-08-04 RX ADMIN — HYDROCODONE BITARTRATE AND ACETAMINOPHEN 1 TABLET: 5; 325 TABLET ORAL at 21:59

## 2018-08-04 RX ADMIN — FORMOTEROL FUMARATE DIHYDRATE 20 MCG: 20 SOLUTION RESPIRATORY (INHALATION) at 19:54

## 2018-08-04 RX ADMIN — Medication 10 ML: at 01:15

## 2018-08-04 ASSESSMENT — PAIN DESCRIPTION - LOCATION
LOCATION: NECK

## 2018-08-04 ASSESSMENT — PAIN DESCRIPTION - FREQUENCY: FREQUENCY: CONTINUOUS

## 2018-08-04 ASSESSMENT — PAIN SCALES - GENERAL
PAINLEVEL_OUTOF10: 6
PAINLEVEL_OUTOF10: 6
PAINLEVEL_OUTOF10: 8
PAINLEVEL_OUTOF10: 8
PAINLEVEL_OUTOF10: 6
PAINLEVEL_OUTOF10: 6

## 2018-08-04 ASSESSMENT — PAIN DESCRIPTION - ORIENTATION: ORIENTATION: RIGHT;LEFT

## 2018-08-04 ASSESSMENT — PAIN DESCRIPTION - ONSET: ONSET: ON-GOING

## 2018-08-04 ASSESSMENT — PAIN DESCRIPTION - DESCRIPTORS
DESCRIPTORS: ACHING;DISCOMFORT;SORE
DESCRIPTORS: ACHING;DISCOMFORT
DESCRIPTORS: ACHING;DISCOMFORT;SORE

## 2018-08-04 ASSESSMENT — PAIN DESCRIPTION - PAIN TYPE
TYPE: ACUTE PAIN
TYPE: ACUTE PAIN
TYPE: CHRONIC PAIN

## 2018-08-04 ASSESSMENT — PAIN DESCRIPTION - PROGRESSION: CLINICAL_PROGRESSION: NOT CHANGED

## 2018-08-04 NOTE — PROGRESS NOTES
METHB, O2HB, COHB, O2CON, HHB, THB in the last 72 hours. Radiology/Other tests reviewed: none    Assessment:     Active Problems:    Bronchospasm  Resolved Problems:    * No resolved hospital problems. *      Plan:       1. Cont with steroids, try prednisone tomorrow  2. Cont with nebs, may try changing to MDI upon discharge  3. ffup as out-pt in 1-2 weeks with PFT  4. Ambulate as tolerated  5. On RA, watch oxygenation        Thanks for letting us see this patient in consultation. Please contact us with any questions. Office [phone] or after hours through [employer], x [phone].

## 2018-08-05 PROCEDURE — 6370000000 HC RX 637 (ALT 250 FOR IP): Performed by: INTERNAL MEDICINE

## 2018-08-05 PROCEDURE — 1200000000 HC SEMI PRIVATE

## 2018-08-05 PROCEDURE — 2580000003 HC RX 258: Performed by: INTERNAL MEDICINE

## 2018-08-05 PROCEDURE — 6360000002 HC RX W HCPCS: Performed by: INTERNAL MEDICINE

## 2018-08-05 PROCEDURE — 94640 AIRWAY INHALATION TREATMENT: CPT

## 2018-08-05 RX ORDER — MONTELUKAST SODIUM 10 MG/1
10 TABLET ORAL NIGHTLY
Status: DISCONTINUED | OUTPATIENT
Start: 2018-08-05 | End: 2018-08-06 | Stop reason: HOSPADM

## 2018-08-05 RX ADMIN — HYDROCODONE BITARTRATE AND ACETAMINOPHEN 1 TABLET: 5; 325 TABLET ORAL at 04:02

## 2018-08-05 RX ADMIN — GABAPENTIN 300 MG: 300 CAPSULE ORAL at 21:26

## 2018-08-05 RX ADMIN — Medication 10 ML: at 09:30

## 2018-08-05 RX ADMIN — HYDROCODONE BITARTRATE AND ACETAMINOPHEN 1 TABLET: 5; 325 TABLET ORAL at 09:34

## 2018-08-05 RX ADMIN — BUDESONIDE 1000 MCG: 0.5 SUSPENSION RESPIRATORY (INHALATION) at 20:09

## 2018-08-05 RX ADMIN — GABAPENTIN 300 MG: 300 CAPSULE ORAL at 13:59

## 2018-08-05 RX ADMIN — IPRATROPIUM BROMIDE AND ALBUTEROL SULFATE 1 AMPULE: .5; 3 SOLUTION RESPIRATORY (INHALATION) at 23:59

## 2018-08-05 RX ADMIN — QUETIAPINE FUMARATE 50 MG: 25 TABLET ORAL at 21:26

## 2018-08-05 RX ADMIN — HYDROCODONE BITARTRATE AND ACETAMINOPHEN 1 TABLET: 5; 325 TABLET ORAL at 22:20

## 2018-08-05 RX ADMIN — HYDROCODONE BITARTRATE AND ACETAMINOPHEN 1 TABLET: 5; 325 TABLET ORAL at 18:12

## 2018-08-05 RX ADMIN — IPRATROPIUM BROMIDE AND ALBUTEROL SULFATE 1 AMPULE: .5; 3 SOLUTION RESPIRATORY (INHALATION) at 03:47

## 2018-08-05 RX ADMIN — Medication 10 ML: at 21:26

## 2018-08-05 RX ADMIN — HYDROCODONE BITARTRATE AND ACETAMINOPHEN 1 TABLET: 5; 325 TABLET ORAL at 13:59

## 2018-08-05 RX ADMIN — PREDNISONE 40 MG: 20 TABLET ORAL at 09:29

## 2018-08-05 RX ADMIN — IPRATROPIUM BROMIDE AND ALBUTEROL SULFATE 1 AMPULE: .5; 3 SOLUTION RESPIRATORY (INHALATION) at 15:53

## 2018-08-05 RX ADMIN — FORMOTEROL FUMARATE DIHYDRATE 20 MCG: 20 SOLUTION RESPIRATORY (INHALATION) at 07:51

## 2018-08-05 RX ADMIN — GABAPENTIN 300 MG: 300 CAPSULE ORAL at 09:29

## 2018-08-05 RX ADMIN — FORMOTEROL FUMARATE DIHYDRATE 20 MCG: 20 SOLUTION RESPIRATORY (INHALATION) at 20:05

## 2018-08-05 RX ADMIN — BUDESONIDE 1000 MCG: 0.5 SUSPENSION RESPIRATORY (INHALATION) at 07:51

## 2018-08-05 RX ADMIN — MONTELUKAST SODIUM 10 MG: 10 TABLET, FILM COATED ORAL at 21:26

## 2018-08-05 RX ADMIN — IPRATROPIUM BROMIDE AND ALBUTEROL SULFATE 1 AMPULE: .5; 3 SOLUTION RESPIRATORY (INHALATION) at 11:52

## 2018-08-05 ASSESSMENT — PAIN DESCRIPTION - LOCATION
LOCATION: NECK

## 2018-08-05 ASSESSMENT — PAIN DESCRIPTION - FREQUENCY
FREQUENCY: CONTINUOUS

## 2018-08-05 ASSESSMENT — PAIN DESCRIPTION - ONSET
ONSET: ON-GOING

## 2018-08-05 ASSESSMENT — PAIN DESCRIPTION - PAIN TYPE
TYPE: CHRONIC PAIN
TYPE: ACUTE PAIN
TYPE: CHRONIC PAIN

## 2018-08-05 ASSESSMENT — PAIN SCALES - GENERAL
PAINLEVEL_OUTOF10: 8
PAINLEVEL_OUTOF10: 6
PAINLEVEL_OUTOF10: 6
PAINLEVEL_OUTOF10: 8
PAINLEVEL_OUTOF10: 2
PAINLEVEL_OUTOF10: 0
PAINLEVEL_OUTOF10: 8
PAINLEVEL_OUTOF10: 8
PAINLEVEL_OUTOF10: 0
PAINLEVEL_OUTOF10: 8

## 2018-08-05 ASSESSMENT — PAIN DESCRIPTION - DESCRIPTORS
DESCRIPTORS: ACHING;DISCOMFORT;SORE
DESCRIPTORS: ACHING;DISCOMFORT
DESCRIPTORS: ACHING;DISCOMFORT;SORE
DESCRIPTORS: ACHING;DISCOMFORT
DESCRIPTORS: ACHING;DISCOMFORT
DESCRIPTORS: ACHING;DISCOMFORT;SORE
DESCRIPTORS: ACHING;DISCOMFORT

## 2018-08-05 ASSESSMENT — PAIN DESCRIPTION - PROGRESSION
CLINICAL_PROGRESSION: NOT CHANGED

## 2018-08-05 ASSESSMENT — PAIN DESCRIPTION - ORIENTATION
ORIENTATION: RIGHT;LEFT

## 2018-08-06 VITALS
OXYGEN SATURATION: 94 % | WEIGHT: 160.06 LBS | BODY MASS INDEX: 24.26 KG/M2 | TEMPERATURE: 98.2 F | HEART RATE: 84 BPM | HEIGHT: 68 IN | RESPIRATION RATE: 15 BRPM | DIASTOLIC BLOOD PRESSURE: 84 MMHG | SYSTOLIC BLOOD PRESSURE: 138 MMHG

## 2018-08-06 PROCEDURE — 2580000003 HC RX 258: Performed by: INTERNAL MEDICINE

## 2018-08-06 PROCEDURE — 6370000000 HC RX 637 (ALT 250 FOR IP): Performed by: INTERNAL MEDICINE

## 2018-08-06 PROCEDURE — 6360000002 HC RX W HCPCS: Performed by: INTERNAL MEDICINE

## 2018-08-06 PROCEDURE — 94640 AIRWAY INHALATION TREATMENT: CPT

## 2018-08-06 RX ORDER — MONTELUKAST SODIUM 10 MG/1
10 TABLET ORAL NIGHTLY
Qty: 30 TABLET | Refills: 3 | Status: SHIPPED | OUTPATIENT
Start: 2018-08-06 | End: 2019-10-07

## 2018-08-06 RX ORDER — PREDNISONE 20 MG/1
40 TABLET ORAL DAILY
Qty: 20 TABLET | Refills: 0 | Status: SHIPPED | OUTPATIENT
Start: 2018-08-07 | End: 2018-08-17

## 2018-08-06 RX ADMIN — HYDROCODONE BITARTRATE AND ACETAMINOPHEN 1 TABLET: 5; 325 TABLET ORAL at 13:34

## 2018-08-06 RX ADMIN — HYDROCODONE BITARTRATE AND ACETAMINOPHEN 1 TABLET: 5; 325 TABLET ORAL at 05:06

## 2018-08-06 RX ADMIN — IPRATROPIUM BROMIDE AND ALBUTEROL SULFATE 1 AMPULE: .5; 3 SOLUTION RESPIRATORY (INHALATION) at 12:34

## 2018-08-06 RX ADMIN — HYDROCODONE BITARTRATE AND ACETAMINOPHEN 1 TABLET: 5; 325 TABLET ORAL at 09:39

## 2018-08-06 RX ADMIN — BUDESONIDE 1000 MCG: 0.5 SUSPENSION RESPIRATORY (INHALATION) at 08:58

## 2018-08-06 RX ADMIN — FORMOTEROL FUMARATE DIHYDRATE 20 MCG: 20 SOLUTION RESPIRATORY (INHALATION) at 08:58

## 2018-08-06 RX ADMIN — IPRATROPIUM BROMIDE AND ALBUTEROL SULFATE 1 AMPULE: .5; 3 SOLUTION RESPIRATORY (INHALATION) at 16:09

## 2018-08-06 RX ADMIN — Medication 10 ML: at 09:38

## 2018-08-06 RX ADMIN — IPRATROPIUM BROMIDE AND ALBUTEROL SULFATE 1 AMPULE: .5; 3 SOLUTION RESPIRATORY (INHALATION) at 03:50

## 2018-08-06 RX ADMIN — GABAPENTIN 300 MG: 300 CAPSULE ORAL at 09:38

## 2018-08-06 RX ADMIN — GABAPENTIN 300 MG: 300 CAPSULE ORAL at 14:58

## 2018-08-06 RX ADMIN — PREDNISONE 40 MG: 20 TABLET ORAL at 09:38

## 2018-08-06 ASSESSMENT — PAIN SCALES - GENERAL
PAINLEVEL_OUTOF10: 6
PAINLEVEL_OUTOF10: 5
PAINLEVEL_OUTOF10: 8
PAINLEVEL_OUTOF10: 6

## 2018-08-06 ASSESSMENT — PAIN DESCRIPTION - LOCATION
LOCATION: NECK
LOCATION: NECK

## 2018-08-06 ASSESSMENT — PAIN DESCRIPTION - DESCRIPTORS
DESCRIPTORS: ACHING;DISCOMFORT;SORE
DESCRIPTORS: ACHING;DISCOMFORT;SORE

## 2018-08-06 ASSESSMENT — PAIN DESCRIPTION - PAIN TYPE
TYPE: CHRONIC PAIN
TYPE: CHRONIC PAIN

## 2018-08-06 NOTE — PROGRESS NOTES
Subjective: The patient is awake and alert. No problems overnight. Denies chest pain, angina, and dyspnea. Denies abdominal pain. Tolerating diet. No nausea or vomiting. He is c/o still not feeling well. He is c/o back pain. Objective:  Pt is aox3 in nad  /78   Pulse 80   Temp 97.9 °F (36.6 °C) (Oral)   Resp 18   Ht 5' 8\" (1.727 m)   Wt 160 lb 1 oz (72.6 kg)   SpO2 96%   BMI 24.34 kg/m²   HEENT no adenopathy no bruits  Heart:  RRR, no murmurs, gallops, or rubs.   Lungs:  Scattered wheeze through all fields Abd: bowel sounds present, nontender, nondistended, no masses  Extrem:  No clubbing, cyanosis, or edema    basic metabolic panel     Assessment:    Patient Active Problem List   Diagnosis    Neck pain    Pure hypercholesterolemia    Vasectomy evaluation    Intractable pain    Neck pain    Bronchospasm       Plan:    Per pulmonary      Mercy Lugo  7:10 AM  8/6/2018

## 2018-08-06 NOTE — PROGRESS NOTES
No clubbing. Neuro: Awake. Follows commands. Positive pupils/gag/corneals. Normal pain response. Results:  CBC:   No results for input(s): WBC, HGB, HCT, MCV, PLT in the last 72 hours. BMP:   No results for input(s): NA, K, CL, CO2, PHOS, BUN, CREATININE in the last 72 hours. Invalid input(s): CA  LIVER PROFILE:   No results for input(s): AST, ALT, LIPASE, BILIDIR, BILITOT, ALKPHOS in the last 72 hours. Invalid input(s): AMYLASE,  ALB  PT/INR: No results for input(s): PROTIME, INR in the last 72 hours. APTT: No results for input(s): APTT in the last 72 hours. Pathology:  1. N/A      Microbiology:  1. None    Recent ABG:   No results for input(s): PH, PO2, PCO2, HCO3, BE, O2SAT, METHB, O2HB, COHB, O2CON, HHB, THB in the last 72 hours. Recent Films:  CT Chest WO Contrast   Final Result      1. Subtle thickening of central airways could be a manifestation of   bronchitis/airway inflammation. 2. There may be a very small retrocardiac hiatus hernia, and nocturnal   reflux can result in subtle aspiration and bronchitis symptoms. 3. There is no evidence of chest wall or cardiovascular pathology, and   no evidence of organized pneumonia or effusion. XR CHEST PORTABLE   Final Result   No radiographic evidence of acute cardiopulmonary disease. Postoperative changes as above. Assessment:  1. Bronchospasm: Acute. Underlying asthma is not excluded. Unfortunately, because a differential count was never sent, atopy cannot be excluded nor can eosinophilia. The chest CT shows modest airway thickening but otherwise no significant pathology. The d-dimer being less than 200 virtually excludes pulmonary embolism in the differential diagnosis. There has been substantial improvement over the past 48 hours      Plan:  1. Check exertional oximetry. If normal, home on inhalers and a prednisone taper.       Care reviewed with the staff and the patient's family as available. Please note that voice recognition technology was used in the preparation of this note and make therefore it may contain inadvertent transcription errors. Jimenez Douglas M.D., F.C.C.P.     [facility], [address] [place] [address], [address] [place] [address]

## 2018-08-06 NOTE — PROGRESS NOTES
Type and Reason for Visit: Initial, RD Nutrition Re-Screen (LOS Assessment, RD Re-Screen Negative)    Nutrition Screen:   · Have you recently lost weight without trying? - 0 to 1 pound (0 points)   · Have you been eating poorly because of a decreased appetite? - No (0 points)   · Malnutrition Screening Tool Score - 0    Dietitian Assessment of Nutrition Re-Screen: Pt assessed per LOS protocol. Pt currently eating ~% of meals, no un-planned wt loss, no non-healing wounds, no noted difficulty chewing/swallowing, no home EN/PN use and no other nutr. issues noted at this time. Will follow per policy. Please consult if needed.           Electronically signed by Alyce Marina RD, AFIA on 8/6/18 at 10:34 AM    Contact Number: ext [phone]

## 2018-08-10 NOTE — DISCHARGE SUMMARY
[address] [address]                                 DISCHARGE SUMMARY    PATIENT NAME: Chitra Mccabe              :        1975  MED REC NO:   [id number]                            ROOM:       0533  ACCOUNT NO:   [de-identified]                           ADMIT DATE: 2018  PROVIDER:     Agnes Hadley DO                  [facility] DATE: 2018      ADMITTING DIAGNOSIS:  Bronchospasm with shortness of breath. DISCHARGE DIAGNOSIS:  Bronchospasm with shortness of breath. DISCHARGE SUMMARY:  This 42-year old male presents from home, directly  admitted by his primary doctor due to the fact that he had progressive  shortness of breath with productive cough. The patient was admitted and  seen in consult by Pulmonology. The patient was placed on steroids. He  improved. He was not short of breath _____. He was discharged to home in  stable condition on tapering dose of steroids. HOSPITAL COURSE:  Unremarkable. LONG TERM PROGNOSIS:  Fair.         Kimi Whitt DO    D: 2018 7:07:33       T: 2018 14:09:27     [email or web address]  Job#: [id number]     Doc#: [id number]    CC:

## 2018-12-15 PROBLEM — Z98.1 HISTORY OF FUSION OF CERVICAL SPINE: Status: ACTIVE | Noted: 2018-12-15

## 2019-01-16 ENCOUNTER — HOSPITAL ENCOUNTER (OUTPATIENT)
Dept: MRI IMAGING | Age: 44
Discharge: HOME | End: 2019-01-18
Payer: COMMERCIAL

## 2019-01-16 DIAGNOSIS — M54.2 CERVICALGIA: ICD-10-CM

## 2019-01-16 DIAGNOSIS — Z98.1 HISTORY OF FUSION OF CERVICAL SPINE: ICD-10-CM

## 2019-01-16 DIAGNOSIS — M54.2 NECK PAIN: ICD-10-CM

## 2019-01-16 PROCEDURE — 72156 MRI NECK SPINE W/O & W/DYE: CPT

## 2019-01-16 PROCEDURE — A9579 GAD-BASE MR CONTRAST NOS,1ML: HCPCS | Performed by: RADIOLOGY

## 2019-01-16 PROCEDURE — 6360000004 HC RX CONTRAST MEDICATION: Performed by: RADIOLOGY

## 2019-01-16 RX ADMIN — GADOTERIDOL 14 ML: 279.3 INJECTION, SOLUTION INTRAVENOUS at 19:18

## 2020-07-01 ENCOUNTER — HOSPITAL ENCOUNTER (EMERGENCY)
Age: 45
Discharge: HOME | End: 2020-07-01
Attending: STUDENT IN AN ORGANIZED HEALTH CARE EDUCATION/TRAINING PROGRAM
Payer: COMMERCIAL

## 2020-07-01 VITALS
DIASTOLIC BLOOD PRESSURE: 66 MMHG | TEMPERATURE: 98.2 F | WEIGHT: 150 LBS | OXYGEN SATURATION: 95 % | RESPIRATION RATE: 18 BRPM | BODY MASS INDEX: 22.22 KG/M2 | HEIGHT: 69 IN | HEART RATE: 104 BPM | SYSTOLIC BLOOD PRESSURE: 114 MMHG

## 2020-07-01 PROCEDURE — 6370000000 HC RX 637 (ALT 250 FOR IP): Performed by: STUDENT IN AN ORGANIZED HEALTH CARE EDUCATION/TRAINING PROGRAM

## 2020-07-01 PROCEDURE — 99282 EMERGENCY DEPT VISIT SF MDM: CPT

## 2020-07-01 PROCEDURE — 6370000000 HC RX 637 (ALT 250 FOR IP)

## 2020-07-01 RX ORDER — 0.9 % SODIUM CHLORIDE 0.9 %
1000 INTRAVENOUS SOLUTION INTRAVENOUS ONCE
Status: DISCONTINUED | OUTPATIENT
Start: 2020-07-01 | End: 2020-07-01 | Stop reason: HOSPADM

## 2020-07-01 RX ORDER — TETRACAINE HYDROCHLORIDE 5 MG/ML
SOLUTION OPHTHALMIC
Status: DISCONTINUED
Start: 2020-07-01 | End: 2020-07-01 | Stop reason: HOSPADM

## 2020-07-01 RX ORDER — LEVOFLOXACIN 5 MG/ML
1 SOLUTION/ DROPS TOPICAL EVERY 4 HOURS
Qty: 1 BOTTLE | Refills: 0 | Status: SHIPPED | OUTPATIENT
Start: 2020-07-01 | End: 2020-07-11

## 2020-07-01 RX ORDER — TETRACAINE HYDROCHLORIDE 5 MG/ML
1 SOLUTION OPHTHALMIC ONCE
Status: COMPLETED | OUTPATIENT
Start: 2020-07-01 | End: 2020-07-01

## 2020-07-01 RX ADMIN — FLUORESCEIN SODIUM 1 MG: 1 STRIP OPHTHALMIC at 17:43

## 2020-07-01 RX ADMIN — TETRACAINE HYDROCHLORIDE 1 DROP: 5 SOLUTION OPHTHALMIC at 19:11

## 2020-07-01 RX ADMIN — FLUORESCEIN SODIUM 1 MG: 1 STRIP OPHTHALMIC at 19:10

## 2020-07-01 ASSESSMENT — VISUAL ACUITY
OU: 20/20
OD: 20/20

## 2020-07-01 ASSESSMENT — PAIN DESCRIPTION - FREQUENCY: FREQUENCY: CONTINUOUS

## 2020-07-01 ASSESSMENT — PAIN DESCRIPTION - PAIN TYPE: TYPE: ACUTE PAIN

## 2020-07-01 ASSESSMENT — PAIN DESCRIPTION - DESCRIPTORS: DESCRIPTORS: BURNING

## 2020-07-01 ASSESSMENT — PAIN DESCRIPTION - ORIENTATION: ORIENTATION: LEFT

## 2020-07-01 ASSESSMENT — PAIN SCALES - GENERAL: PAINLEVEL_OUTOF10: 8

## 2020-07-01 ASSESSMENT — PAIN DESCRIPTION - LOCATION: LOCATION: EYE

## 2020-07-01 NOTE — ED NOTES
Pt presents with left eye pain, redness and tearing after splashing pinesol in it today     [place] BALDO Aguayo  07/01/20 6039

## 2020-07-01 NOTE — ED PROVIDER NOTES
HPI:  7/1/20, Time: 5:33 PM EDT         Mercedes Riley is a 38 y.o. male presenting to the ED for eye problem, beginning 1 hour ago. The complaint has been persistent, moderate in severity, and worsened by nothing. Patient states that he hit a half a cup of Pine-Sol and dirt that splashed up into his left eye. He is having pain in that eye. States that he used saline solution for his contacts at the time of the incident. He also remove the contact in that eye. Did not affect the other eye. He denies any fevers, nausea/vomiting, chest pain, shortness of breath, abdominal pain, other symptoms at this time. States that he is blurry in that eye, but it is because he does not have his contact in it. He did not have any visual acuity changes at home prior to removing the contact lens. Patient has no other complaints at this time. Patient denies fever/chills, cough, congestion, chest pain, shortness of breath, edema, headache, visual disturbances, focal paresthesias, focal weakness, abdominal pain, nausea, vomiting, diarrhea, constipation, dysuria, hematuria, trauma, neck or back pain or other complaints. ROS:   Pertinent positives and negatives are stated within HPI, all other systems reviewed and are negative.      --------------------------------------------- PAST HISTORY ---------------------------------------------  Past Medical History:  has a past medical history of Anxiety, HIGH CHOLESTEROL, and Osteoarthritis. Past Surgical History:  has a past surgical history that includes Neck surgery; Spinal fusion; and cervical fusion (05/26/2015). Social History:  reports that he has never smoked. He has never used smokeless tobacco. He reports current alcohol use. He reports that he does not use drugs. Family History: family history includes Stroke in his father. The patients home medications have been reviewed.     Allergies: Patient has no known allergies. ---------------------------------------------------PHYSICAL EXAM--------------------------------------    Constitutional:  Well developed, well nourished, no acute distress, non-toxic appearance   Eyes:  PERRL, conjunctiva is erythematous. No foreign bodies noted. No reduction in range of motion. HENT:  Atraumatic, external ears normal, nose normal, oropharynx moist. Neck- normal range of motion, no tenderness, supple   Respiratory:  No respiratory distress, normal breath sounds, no rales, no wheezing   Cardiovascular:  Normal rate, normal rhythm, no murmurs, no gallops, no rubs. Radial and DP pulses 2+ bilaterally. GI:  Soft, nondistended, normal bowel sounds, nontender, no organomegaly, no mass, no rebound, no guarding   :  No costovertebral angle tenderness   Musculoskeletal:  No edema, no tenderness, no deformities. Back- no tenderness  Integument:  Well hydrated, no rash. Adequate perfusion. Lymphatic:  No lymphadenopathy noted   Neurologic:  Alert & oriented x 3, CN 2-12 normal, normal motor function, normal sensory function, no focal deficits noted. Psychiatric:  Speech and behavior appropriate       -------------------------------------------------- RESULTS -------------------------------------------------  I have personally reviewed all laboratory and imaging results for this patient. Results are listed below. LABS:  No results found for this visit on 07/01/20. RADIOLOGY:  Interpreted by Radiologist.  No orders to display             ------------------------- NURSING NOTES AND VITALS REVIEWED ---------------------------   The nursing notes within the ED encounter and vital signs as below have been reviewed by myself.   /66   Pulse 104   Temp 98.2 °F (36.8 °C) (Temporal)   Resp 18   Ht 5' 8.5\" (1.74 m)   Wt 150 lb (68 kg)   SpO2 95%   BMI 22.48 kg/m²   Oxygen Saturation Interpretation: Normal    The patients available past medical records and past encounters were reviewed. ------------------------------ ED COURSE/MEDICAL DECISION MAKING----------------------  Medications   fluorescein 1 MG ophthalmic strip (1 mg  Given 7/1/20 1743)   fluorescein ophthalmic strip 1 mg (1 mg Left Eye Given 7/1/20 1910)   tetracaine (TETRAVISC) 0.5 % ophthalmic solution 1 drop (1 drop Left Eye Given 7/1/20 1911)           Procedures:  Elveria Aydlett lamp exam      Medical Decision Making:   Patient had good effect with irrigation emergency department. pH strip showed a pH of 7.0 in the emergency department following irrigation. Patient was given prescription for antibiotic coverage to assure no infection settles in. This appears to be a chemical conjunctivitis. He was instructed to follow-up with his ophthalmologist.  He already has an appointment with his ophthalmologist prior to discharging patient. Patient agreed with this plan was discharged home. He was told to use ibuprofen for any pain management as needed. This patient's ED course included: a personal history and physicial examination, re-evaluation prior to disposition and complex medical decision making and emergency management    This patient has remained hemodynamically stable and improved during their ED course. Re-Evaluations:             Time: 18:24  Re-evaluation. Patients symptoms are improving  Repeat physical examination is significantly improved  States that he is still feeling some discomfort to the upper portion of his eye. We will do a slit-lamp scan to assess for possible abrasion of some sort. Discussed case with poison control. They recommend flushing. They also had questions regarding the type of Pine-Sol that was used. Patient states that this was over-the-counter basic Pine-Sol. Not industrial-strength Pine-Sol or no Pine-Sol with lemon. Patient has been flushed with a liter of saline. Will check pH level as well. Consultations:  Poison control was called and consulted. They state as long as patient's pH level is within normal limits after irrigation that he needs to follow-up with ophthalmology. Counseling: The emergency provider has spoken with the patient and discussed todays results, in addition to providing specific details for the plan of care and counseling regarding the diagnosis and prognosis. Questions are answered at this time and they are agreeable with the plan.       --------------------------------- IMPRESSION AND DISPOSITION ---------------------------------    IMPRESSION  1.  Chemical conjunctivitis of left eye        DISPOSITION  Disposition: Discharge to home  Patient condition is stable                 Alycia Deshpande DO  07/01/20 7095

## 2023-02-16 ENCOUNTER — HOSPITAL ENCOUNTER (EMERGENCY)
Age: 48
Discharge: HOME | End: 2023-02-16
Payer: MEDICARE

## 2023-02-16 ENCOUNTER — APPOINTMENT (OUTPATIENT)
Dept: CT IMAGING | Age: 48
End: 2023-02-16
Payer: MEDICARE

## 2023-02-16 VITALS
WEIGHT: 150 LBS | SYSTOLIC BLOOD PRESSURE: 146 MMHG | OXYGEN SATURATION: 99 % | TEMPERATURE: 98.2 F | BODY MASS INDEX: 22.48 KG/M2 | DIASTOLIC BLOOD PRESSURE: 99 MMHG | HEART RATE: 89 BPM | RESPIRATION RATE: 18 BRPM

## 2023-02-16 DIAGNOSIS — M51.37 DEGENERATIVE DISC DISEASE AT L5-S1 LEVEL: Primary | ICD-10-CM

## 2023-02-16 DIAGNOSIS — M51.26 HERNIATED LUMBAR INTERVERTEBRAL DISC: ICD-10-CM

## 2023-02-16 DIAGNOSIS — S39.012A BACK STRAIN, INITIAL ENCOUNTER: ICD-10-CM

## 2023-02-16 LAB
ALBUMIN SERPL-MCNC: 4.7 G/DL (ref 3.5–5.2)
ALP BLD-CCNC: 64 U/L (ref 40–129)
ALT SERPL-CCNC: 39 U/L (ref 0–40)
ANION GAP SERPL CALCULATED.3IONS-SCNC: 12 MMOL/L (ref 7–16)
AST SERPL-CCNC: 25 U/L (ref 0–39)
BACTERIA: NORMAL /HPF
BASOPHILS ABSOLUTE: 0.04 E9/L (ref 0–0.2)
BASOPHILS RELATIVE PERCENT: 0.7 % (ref 0–2)
BILIRUB SERPL-MCNC: 0.5 MG/DL (ref 0–1.2)
BILIRUBIN URINE: NEGATIVE
BLOOD, URINE: NEGATIVE
BUN BLDV-MCNC: 12 MG/DL (ref 6–20)
CALCIUM SERPL-MCNC: 10.1 MG/DL (ref 8.6–10.2)
CHLORIDE BLD-SCNC: 105 MMOL/L (ref 98–107)
CLARITY: CLEAR
CO2: 24 MMOL/L (ref 22–29)
COLOR: YELLOW
CREAT SERPL-MCNC: 0.9 MG/DL (ref 0.7–1.2)
EOSINOPHILS ABSOLUTE: 0.07 E9/L (ref 0.05–0.5)
EOSINOPHILS RELATIVE PERCENT: 1.2 % (ref 0–6)
GFR SERPL CREATININE-BSD FRML MDRD: >60 ML/MIN/1.73
GLUCOSE BLD-MCNC: 109 MG/DL (ref 74–99)
GLUCOSE URINE: NEGATIVE MG/DL
HCT VFR BLD CALC: 42.5 % (ref 37–54)
HEMOGLOBIN: 14.9 G/DL (ref 12.5–16.5)
IMMATURE GRANULOCYTES #: 0.03 E9/L
IMMATURE GRANULOCYTES %: 0.5 % (ref 0–5)
KETONES, URINE: NEGATIVE MG/DL
LEUKOCYTE ESTERASE, URINE: NEGATIVE
LYMPHOCYTES ABSOLUTE: 2.35 E9/L (ref 1.5–4)
LYMPHOCYTES RELATIVE PERCENT: 40 % (ref 20–42)
MCH RBC QN AUTO: 29.1 PG (ref 26–35)
MCHC RBC AUTO-ENTMCNC: 35.1 % (ref 32–34.5)
MCV RBC AUTO: 83 FL (ref 80–99.9)
MONOCYTES ABSOLUTE: 0.45 E9/L (ref 0.1–0.95)
MONOCYTES RELATIVE PERCENT: 7.7 % (ref 2–12)
NEUTROPHILS ABSOLUTE: 2.94 E9/L (ref 1.8–7.3)
NEUTROPHILS RELATIVE PERCENT: 49.9 % (ref 43–80)
NITRITE, URINE: NEGATIVE
PDW BLD-RTO: 12 FL (ref 11.5–15)
PH UA: 7 (ref 5–9)
PLATELET # BLD: 205 E9/L (ref 130–450)
PMV BLD AUTO: 9.6 FL (ref 7–12)
POTASSIUM SERPL-SCNC: 4 MMOL/L (ref 3.5–5)
PROTEIN UA: NEGATIVE MG/DL
RBC # BLD: 5.12 E12/L (ref 3.8–5.8)
RBC UA: NORMAL /HPF (ref 0–2)
SODIUM BLD-SCNC: 141 MMOL/L (ref 132–146)
SPECIFIC GRAVITY UA: 1.01 (ref 1–1.03)
TOTAL PROTEIN: 7.1 G/DL (ref 6.4–8.3)
UROBILINOGEN, URINE: 0.2 E.U./DL
WBC # BLD: 5.9 E9/L (ref 4.5–11.5)
WBC UA: NORMAL /HPF (ref 0–5)

## 2023-02-16 PROCEDURE — 6360000002 HC RX W HCPCS: Performed by: PHYSICIAN ASSISTANT

## 2023-02-16 PROCEDURE — 85025 COMPLETE CBC W/AUTO DIFF WBC: CPT

## 2023-02-16 PROCEDURE — 6370000000 HC RX 637 (ALT 250 FOR IP): Performed by: PHYSICIAN ASSISTANT

## 2023-02-16 PROCEDURE — 72131 CT LUMBAR SPINE W/O DYE: CPT

## 2023-02-16 PROCEDURE — 80053 COMPREHEN METABOLIC PANEL: CPT

## 2023-02-16 PROCEDURE — 81001 URINALYSIS AUTO W/SCOPE: CPT

## 2023-02-16 PROCEDURE — 99284 EMERGENCY DEPT VISIT MOD MDM: CPT

## 2023-02-16 PROCEDURE — 96372 THER/PROPH/DIAG INJ SC/IM: CPT

## 2023-02-16 RX ORDER — DEXAMETHASONE 4 MG/1
4 TABLET ORAL 2 TIMES DAILY WITH MEALS
Qty: 20 TABLET | Refills: 0 | Status: SHIPPED | OUTPATIENT
Start: 2023-02-16 | End: 2023-02-26

## 2023-02-16 RX ORDER — OXYCODONE HYDROCHLORIDE AND ACETAMINOPHEN 5; 325 MG/1; MG/1
1 TABLET ORAL ONCE
Status: COMPLETED | OUTPATIENT
Start: 2023-02-16 | End: 2023-02-16

## 2023-02-16 RX ORDER — ORPHENADRINE CITRATE 30 MG/ML
60 INJECTION INTRAMUSCULAR; INTRAVENOUS ONCE
Status: COMPLETED | OUTPATIENT
Start: 2023-02-16 | End: 2023-02-16

## 2023-02-16 RX ORDER — METHOCARBAMOL 500 MG/1
500 TABLET, FILM COATED ORAL 2 TIMES DAILY
Qty: 20 TABLET | Refills: 0 | Status: SHIPPED | OUTPATIENT
Start: 2023-02-16 | End: 2023-02-26

## 2023-02-16 RX ORDER — MORPHINE SULFATE 4 MG/ML
6 INJECTION, SOLUTION INTRAMUSCULAR; INTRAVENOUS ONCE
Status: COMPLETED | OUTPATIENT
Start: 2023-02-16 | End: 2023-02-16

## 2023-02-16 RX ORDER — IBUPROFEN 600 MG/1
600 TABLET ORAL 3 TIMES DAILY PRN
Qty: 30 TABLET | Refills: 0 | Status: SHIPPED | OUTPATIENT
Start: 2023-02-16

## 2023-02-16 RX ORDER — LIDOCAINE 50 MG/G
1 PATCH TOPICAL EVERY 24 HOURS
Qty: 30 PATCH | Refills: 0 | Status: SHIPPED | OUTPATIENT
Start: 2023-02-16 | End: 2023-03-18

## 2023-02-16 RX ORDER — KETOROLAC TROMETHAMINE 30 MG/ML
30 INJECTION, SOLUTION INTRAMUSCULAR; INTRAVENOUS ONCE
Status: COMPLETED | OUTPATIENT
Start: 2023-02-16 | End: 2023-02-16

## 2023-02-16 RX ADMIN — ORPHENADRINE CITRATE 60 MG: 30 INJECTION INTRAMUSCULAR; INTRAVENOUS at 10:28

## 2023-02-16 RX ADMIN — KETOROLAC TROMETHAMINE 30 MG: 30 INJECTION, SOLUTION INTRAMUSCULAR at 10:29

## 2023-02-16 RX ADMIN — OXYCODONE AND ACETAMINOPHEN 1 TABLET: 5; 325 TABLET ORAL at 10:28

## 2023-02-16 RX ADMIN — MORPHINE SULFATE 6 MG: 4 INJECTION, SOLUTION INTRAMUSCULAR; INTRAVENOUS at 13:17

## 2023-02-16 ASSESSMENT — PAIN - FUNCTIONAL ASSESSMENT: PAIN_FUNCTIONAL_ASSESSMENT: 0-10

## 2023-02-16 ASSESSMENT — LIFESTYLE VARIABLES
HOW OFTEN DO YOU HAVE A DRINK CONTAINING ALCOHOL: NEVER
HOW MANY STANDARD DRINKS CONTAINING ALCOHOL DO YOU HAVE ON A TYPICAL DAY: PATIENT DOES NOT DRINK

## 2023-02-16 ASSESSMENT — PAIN DESCRIPTION - LOCATION
LOCATION: BACK
LOCATION: BACK

## 2023-02-16 ASSESSMENT — PAIN DESCRIPTION - ONSET: ONSET: ON-GOING

## 2023-02-16 ASSESSMENT — PAIN DESCRIPTION - DESCRIPTORS
DESCRIPTORS: SHARP;SHOOTING
DESCRIPTORS: ACHING;SPASM;THROBBING

## 2023-02-16 ASSESSMENT — PAIN SCALES - GENERAL
PAINLEVEL_OUTOF10: 10
PAINLEVEL_OUTOF10: 10

## 2023-02-16 ASSESSMENT — PAIN DESCRIPTION - FREQUENCY: FREQUENCY: CONTINUOUS

## 2023-02-16 ASSESSMENT — PAIN DESCRIPTION - PAIN TYPE: TYPE: ACUTE PAIN

## 2023-02-16 ASSESSMENT — PAIN DESCRIPTION - ORIENTATION: ORIENTATION: LOWER;LEFT

## 2023-02-16 NOTE — DISCHARGE INSTRUCTIONS
ibuprofen twice daily with food. Tylenol for mild-to-moderate pain. Norco as needed for moderate to severe pain and Robaxin for spasm, however use caution as may cause drowsiness or sedation. Do not drive or drink alcohol while taking. Any increased pain, worsening symptoms or concerns including numbness, tingling, sensation loss to the legs, genitals, rectum, or loss of bowel or bladder control should return to the ER for evaluation. Otherwise follow-up with your PCP in 5-7 days for recheck.
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
## 2023-02-19 ENCOUNTER — HOSPITAL ENCOUNTER (INPATIENT)
Age: 48
LOS: 4 days | Discharge: HOME | DRG: 552 | End: 2023-02-25
Attending: EMERGENCY MEDICINE | Admitting: INTERNAL MEDICINE
Payer: MEDICARE

## 2023-02-19 ENCOUNTER — APPOINTMENT (OUTPATIENT)
Dept: CT IMAGING | Age: 48
DRG: 552 | End: 2023-02-19
Payer: MEDICARE

## 2023-02-19 DIAGNOSIS — D72.829 LEUKOCYTOSIS, UNSPECIFIED TYPE: ICD-10-CM

## 2023-02-19 DIAGNOSIS — M54.50 ACUTE LEFT-SIDED LOW BACK PAIN WITHOUT SCIATICA: Primary | ICD-10-CM

## 2023-02-19 DIAGNOSIS — E87.20 LACTIC ACIDOSIS: ICD-10-CM

## 2023-02-19 LAB
ALBUMIN SERPL-MCNC: 4.8 G/DL (ref 3.5–5.2)
ALP BLD-CCNC: 53 U/L (ref 40–129)
ALT SERPL-CCNC: 31 U/L (ref 0–40)
ANION GAP SERPL CALCULATED.3IONS-SCNC: 14 MMOL/L (ref 7–16)
AST SERPL-CCNC: 31 U/L (ref 0–39)
BACTERIA: ABNORMAL /HPF
BASOPHILS ABSOLUTE: 0.01 E9/L (ref 0–0.2)
BASOPHILS RELATIVE PERCENT: 0.1 % (ref 0–2)
BILIRUB SERPL-MCNC: 0.7 MG/DL (ref 0–1.2)
BILIRUBIN URINE: NEGATIVE
BLOOD, URINE: NEGATIVE
BUN BLDV-MCNC: 22 MG/DL (ref 6–20)
CALCIUM SERPL-MCNC: 10.2 MG/DL (ref 8.6–10.2)
CHLORIDE BLD-SCNC: 104 MMOL/L (ref 98–107)
CLARITY: CLEAR
CO2: 21 MMOL/L (ref 22–29)
COLOR: YELLOW
CREAT SERPL-MCNC: 0.9 MG/DL (ref 0.7–1.2)
EOSINOPHILS ABSOLUTE: 0 E9/L (ref 0.05–0.5)
EOSINOPHILS RELATIVE PERCENT: 0 % (ref 0–6)
GFR SERPL CREATININE-BSD FRML MDRD: >60 ML/MIN/1.73
GLUCOSE BLD-MCNC: 102 MG/DL (ref 74–99)
GLUCOSE URINE: NEGATIVE MG/DL
HCT VFR BLD CALC: 45 % (ref 37–54)
HEMOGLOBIN: 15.5 G/DL (ref 12.5–16.5)
IMMATURE GRANULOCYTES #: 0.06 E9/L
IMMATURE GRANULOCYTES %: 0.4 % (ref 0–5)
KETONES, URINE: NEGATIVE MG/DL
LACTIC ACID: 1.6 MMOL/L (ref 0.5–2.2)
LACTIC ACID: 3.2 MMOL/L (ref 0.5–2.2)
LEUKOCYTE ESTERASE, URINE: NEGATIVE
LIPASE: 55 U/L (ref 13–60)
LYMPHOCYTES ABSOLUTE: 3.22 E9/L (ref 1.5–4)
LYMPHOCYTES RELATIVE PERCENT: 22.8 % (ref 20–42)
MCH RBC QN AUTO: 29.6 PG (ref 26–35)
MCHC RBC AUTO-ENTMCNC: 34.4 % (ref 32–34.5)
MCV RBC AUTO: 85.9 FL (ref 80–99.9)
MONOCYTES ABSOLUTE: 1.01 E9/L (ref 0.1–0.95)
MONOCYTES RELATIVE PERCENT: 7.1 % (ref 2–12)
NEUTROPHILS ABSOLUTE: 9.84 E9/L (ref 1.8–7.3)
NEUTROPHILS RELATIVE PERCENT: 69.6 % (ref 43–80)
NITRITE, URINE: NEGATIVE
PDW BLD-RTO: 12.2 FL (ref 11.5–15)
PH UA: 7 (ref 5–9)
PLATELET # BLD: 260 E9/L (ref 130–450)
PMV BLD AUTO: 10 FL (ref 7–12)
POTASSIUM SERPL-SCNC: 3.7 MMOL/L (ref 3.5–5)
PROTEIN UA: NORMAL MG/DL
RBC # BLD: 5.24 E12/L (ref 3.8–5.8)
RBC UA: ABNORMAL /HPF (ref 0–2)
SODIUM BLD-SCNC: 139 MMOL/L (ref 132–146)
SPECIFIC GRAVITY UA: 1.01 (ref 1–1.03)
TOTAL PROTEIN: 7.5 G/DL (ref 6.4–8.3)
UROBILINOGEN, URINE: 0.2 E.U./DL
WBC # BLD: 14.1 E9/L (ref 4.5–11.5)
WBC UA: ABNORMAL /HPF (ref 0–5)

## 2023-02-19 PROCEDURE — 96374 THER/PROPH/DIAG INJ IV PUSH: CPT

## 2023-02-19 PROCEDURE — 2580000003 HC RX 258: Performed by: EMERGENCY MEDICINE

## 2023-02-19 PROCEDURE — 81001 URINALYSIS AUTO W/SCOPE: CPT

## 2023-02-19 PROCEDURE — 71275 CT ANGIOGRAPHY CHEST: CPT

## 2023-02-19 PROCEDURE — 83605 ASSAY OF LACTIC ACID: CPT

## 2023-02-19 PROCEDURE — 6360000002 HC RX W HCPCS: Performed by: EMERGENCY MEDICINE

## 2023-02-19 PROCEDURE — 83690 ASSAY OF LIPASE: CPT

## 2023-02-19 PROCEDURE — 2500000003 HC RX 250 WO HCPCS: Performed by: EMERGENCY MEDICINE

## 2023-02-19 PROCEDURE — 36415 COLL VENOUS BLD VENIPUNCTURE: CPT

## 2023-02-19 PROCEDURE — 96375 TX/PRO/DX INJ NEW DRUG ADDON: CPT

## 2023-02-19 PROCEDURE — 80053 COMPREHEN METABOLIC PANEL: CPT

## 2023-02-19 PROCEDURE — 99285 EMERGENCY DEPT VISIT HI MDM: CPT

## 2023-02-19 PROCEDURE — 85025 COMPLETE CBC W/AUTO DIFF WBC: CPT

## 2023-02-19 PROCEDURE — 74174 CTA ABD&PLVS W/CONTRAST: CPT

## 2023-02-19 PROCEDURE — 6360000004 HC RX CONTRAST MEDICATION: Performed by: RADIOLOGY

## 2023-02-19 RX ORDER — FENTANYL CITRATE 50 UG/ML
50 INJECTION, SOLUTION INTRAMUSCULAR; INTRAVENOUS ONCE
Status: COMPLETED | OUTPATIENT
Start: 2023-02-19 | End: 2023-02-19

## 2023-02-19 RX ORDER — 0.9 % SODIUM CHLORIDE 0.9 %
1000 INTRAVENOUS SOLUTION INTRAVENOUS ONCE
Status: COMPLETED | OUTPATIENT
Start: 2023-02-19 | End: 2023-02-19

## 2023-02-19 RX ORDER — KETOROLAC TROMETHAMINE 30 MG/ML
30 INJECTION, SOLUTION INTRAMUSCULAR; INTRAVENOUS ONCE
Status: COMPLETED | OUTPATIENT
Start: 2023-02-19 | End: 2023-02-19

## 2023-02-19 RX ORDER — DEXAMETHASONE SODIUM PHOSPHATE 4 MG/ML
10 INJECTION, SOLUTION INTRA-ARTICULAR; INTRALESIONAL; INTRAMUSCULAR; INTRAVENOUS; SOFT TISSUE ONCE
Status: COMPLETED | OUTPATIENT
Start: 2023-02-19 | End: 2023-02-19

## 2023-02-19 RX ORDER — KETAMINE HCL IN NACL, ISO-OSM 100MG/10ML
0.2 SYRINGE (ML) INJECTION ONCE
Status: COMPLETED | OUTPATIENT
Start: 2023-02-19 | End: 2023-02-19

## 2023-02-19 RX ADMIN — FENTANYL CITRATE 50 MCG: 0.05 INJECTION, SOLUTION INTRAMUSCULAR; INTRAVENOUS at 20:13

## 2023-02-19 RX ADMIN — IOPAMIDOL 90 ML: 755 INJECTION, SOLUTION INTRAVENOUS at 21:01

## 2023-02-19 RX ADMIN — SODIUM CHLORIDE 1000 ML: 9 INJECTION, SOLUTION INTRAVENOUS at 20:12

## 2023-02-19 RX ADMIN — KETOROLAC TROMETHAMINE 30 MG: 30 INJECTION, SOLUTION INTRAMUSCULAR; INTRAVENOUS at 23:27

## 2023-02-19 RX ADMIN — DEXAMETHASONE SODIUM PHOSPHATE 10 MG: 4 INJECTION, SOLUTION INTRA-ARTICULAR; INTRALESIONAL; INTRAMUSCULAR; INTRAVENOUS; SOFT TISSUE at 22:39

## 2023-02-19 RX ADMIN — Medication 13.6 MG: at 23:27

## 2023-02-19 ASSESSMENT — PAIN DESCRIPTION - LOCATION
LOCATION: BACK
LOCATION: BACK;ABDOMEN
LOCATION: BACK

## 2023-02-19 ASSESSMENT — ENCOUNTER SYMPTOMS
NAUSEA: 0
VOMITING: 0
SHORTNESS OF BREATH: 0
EYE REDNESS: 0
BACK PAIN: 1
ABDOMINAL PAIN: 0

## 2023-02-19 ASSESSMENT — PAIN - FUNCTIONAL ASSESSMENT
PAIN_FUNCTIONAL_ASSESSMENT: 0-10
PAIN_FUNCTIONAL_ASSESSMENT: 0-10

## 2023-02-19 ASSESSMENT — PAIN SCALES - GENERAL
PAINLEVEL_OUTOF10: 10

## 2023-02-19 ASSESSMENT — PAIN DESCRIPTION - ORIENTATION
ORIENTATION: LOWER
ORIENTATION: LOWER;LEFT
ORIENTATION: LEFT;MID

## 2023-02-19 ASSESSMENT — PAIN DESCRIPTION - DESCRIPTORS: DESCRIPTORS: BURNING

## 2023-02-19 NOTE — ED NOTES
Department of Emergency Medicine  FIRST PROVIDER TRIAGE NOTE             [facility]           2/19/23  5:18 PM EST    Date of Encounter: 2/19/23   MRN: [id number]      HPI: Elpidio Short is a 49 y.o. male who presents to the ED for Back Pain (Seen Thursday, middle of back hurts, abdominal pain since yesterday , left upper and lower quad pain, pt unable to sit up, seen at pain management- prescribed norco )  Presents with a flareup of low back pain was seen here 2 days ago for the same complaint. Was diagnosed with worsening of his degenerative disc disease. States he started having left lower quadrant abdominal pain yesterday. Did have a normal bowel movement earlier today. He is in pain management and is currently on 7.5 mg Norco.  He has been seen by Dr. Cheri Gallo in the past.    ROS: Negative for cp or sob. PE: Gen Appearance/Constitutional: alert  CV: regular rate     Initial Plan of Care: All treatment areas with department are currently occupied. Plan to order/Initiate the following while awaiting opening in ED: labs and imaging studies.   Initiate Treatment-Testing, Proceed toTreatment Area When Bed Available for ED Attending/MLP to Continue Care    Electronically signed by COLIN Trinh CNP   DD: 2/19/23       COLIN Pan CNP  02/19/23 6532

## 2023-02-20 ENCOUNTER — APPOINTMENT (OUTPATIENT)
Dept: MRI IMAGING | Age: 48
DRG: 552 | End: 2023-02-20
Payer: MEDICARE

## 2023-02-20 ENCOUNTER — APPOINTMENT (OUTPATIENT)
Dept: GENERAL RADIOLOGY | Age: 48
DRG: 552 | End: 2023-02-20
Payer: MEDICARE

## 2023-02-20 PROBLEM — M54.9 INTRACTABLE BACK PAIN: Status: ACTIVE | Noted: 2023-02-20

## 2023-02-20 PROBLEM — M54.50 ACUTE LEFT-SIDED LOW BACK PAIN WITHOUT SCIATICA: Status: ACTIVE | Noted: 2023-02-20

## 2023-02-20 LAB — LACTIC ACID: 2.9 MMOL/L (ref 0.5–2.2)

## 2023-02-20 PROCEDURE — 6360000002 HC RX W HCPCS: Performed by: INTERNAL MEDICINE

## 2023-02-20 PROCEDURE — 2580000003 HC RX 258: Performed by: EMERGENCY MEDICINE

## 2023-02-20 PROCEDURE — 96375 TX/PRO/DX INJ NEW DRUG ADDON: CPT

## 2023-02-20 PROCEDURE — 99222 1ST HOSP IP/OBS MODERATE 55: CPT | Performed by: NEUROLOGICAL SURGERY

## 2023-02-20 PROCEDURE — 72157 MRI CHEST SPINE W/O & W/DYE: CPT

## 2023-02-20 PROCEDURE — 83605 ASSAY OF LACTIC ACID: CPT

## 2023-02-20 PROCEDURE — 36415 COLL VENOUS BLD VENIPUNCTURE: CPT

## 2023-02-20 PROCEDURE — G0378 HOSPITAL OBSERVATION PER HR: HCPCS

## 2023-02-20 PROCEDURE — 96376 TX/PRO/DX INJ SAME DRUG ADON: CPT

## 2023-02-20 PROCEDURE — 87077 CULTURE AEROBIC IDENTIFY: CPT

## 2023-02-20 PROCEDURE — 6370000000 HC RX 637 (ALT 250 FOR IP): Performed by: INTERNAL MEDICINE

## 2023-02-20 PROCEDURE — 6360000004 HC RX CONTRAST MEDICATION: Performed by: RADIOLOGY

## 2023-02-20 PROCEDURE — 73502 X-RAY EXAM HIP UNI 2-3 VIEWS: CPT

## 2023-02-20 PROCEDURE — 72158 MRI LUMBAR SPINE W/O & W/DYE: CPT

## 2023-02-20 PROCEDURE — 87040 BLOOD CULTURE FOR BACTERIA: CPT

## 2023-02-20 PROCEDURE — 6360000002 HC RX W HCPCS: Performed by: EMERGENCY MEDICINE

## 2023-02-20 PROCEDURE — 85610 PROTHROMBIN TIME: CPT

## 2023-02-20 PROCEDURE — 96366 THER/PROPH/DIAG IV INF ADDON: CPT

## 2023-02-20 PROCEDURE — 96372 THER/PROPH/DIAG INJ SC/IM: CPT

## 2023-02-20 PROCEDURE — 87186 SC STD MICRODIL/AGAR DIL: CPT

## 2023-02-20 PROCEDURE — A9579 GAD-BASE MR CONTRAST NOS,1ML: HCPCS | Performed by: RADIOLOGY

## 2023-02-20 PROCEDURE — 96365 THER/PROPH/DIAG IV INF INIT: CPT

## 2023-02-20 PROCEDURE — 87150 DNA/RNA AMPLIFIED PROBE: CPT

## 2023-02-20 PROCEDURE — 72120 X-RAY BEND ONLY L-S SPINE: CPT

## 2023-02-20 RX ORDER — HYDROCODONE BITARTRATE AND ACETAMINOPHEN 7.5; 325 MG/1; MG/1
1 TABLET ORAL 3 TIMES DAILY PRN
COMMUNITY

## 2023-02-20 RX ORDER — ROSUVASTATIN CALCIUM 10 MG/1
10 TABLET, COATED ORAL NIGHTLY
Status: DISCONTINUED | OUTPATIENT
Start: 2023-02-20 | End: 2023-02-25 | Stop reason: HOSPADM

## 2023-02-20 RX ORDER — QUETIAPINE FUMARATE 25 MG/1
50 TABLET, FILM COATED ORAL 3 TIMES DAILY
Status: DISCONTINUED | OUTPATIENT
Start: 2023-02-20 | End: 2023-02-20

## 2023-02-20 RX ORDER — QUETIAPINE FUMARATE 25 MG/1
100 TABLET, FILM COATED ORAL NIGHTLY
Status: DISCONTINUED | OUTPATIENT
Start: 2023-02-20 | End: 2023-02-25 | Stop reason: HOSPADM

## 2023-02-20 RX ORDER — IBUPROFEN 400 MG/1
600 TABLET ORAL 3 TIMES DAILY PRN
Status: DISCONTINUED | OUTPATIENT
Start: 2023-02-20 | End: 2023-02-20

## 2023-02-20 RX ORDER — ENOXAPARIN SODIUM 100 MG/ML
40 INJECTION SUBCUTANEOUS DAILY
Status: DISCONTINUED | OUTPATIENT
Start: 2023-02-20 | End: 2023-02-20

## 2023-02-20 RX ORDER — GABAPENTIN 300 MG/1
300 CAPSULE ORAL 3 TIMES DAILY
Status: DISCONTINUED | OUTPATIENT
Start: 2023-02-20 | End: 2023-02-25 | Stop reason: HOSPADM

## 2023-02-20 RX ORDER — IPRATROPIUM BROMIDE AND ALBUTEROL SULFATE 2.5; .5 MG/3ML; MG/3ML
1 SOLUTION RESPIRATORY (INHALATION) 4 TIMES DAILY
Status: DISCONTINUED | OUTPATIENT
Start: 2023-02-20 | End: 2023-02-25 | Stop reason: HOSPADM

## 2023-02-20 RX ORDER — PANTOPRAZOLE SODIUM 40 MG/1
40 TABLET, DELAYED RELEASE ORAL
Status: DISCONTINUED | OUTPATIENT
Start: 2023-02-20 | End: 2023-02-25 | Stop reason: HOSPADM

## 2023-02-20 RX ORDER — METHOCARBAMOL 500 MG/1
500 TABLET, FILM COATED ORAL 2 TIMES DAILY
Status: DISCONTINUED | OUTPATIENT
Start: 2023-02-20 | End: 2023-02-25 | Stop reason: HOSPADM

## 2023-02-20 RX ORDER — HYDROCODONE BITARTRATE AND ACETAMINOPHEN 7.5; 325 MG/1; MG/1
1 TABLET ORAL 3 TIMES DAILY PRN
Status: DISCONTINUED | OUTPATIENT
Start: 2023-02-20 | End: 2023-02-25 | Stop reason: HOSPADM

## 2023-02-20 RX ORDER — ONDANSETRON 2 MG/ML
4 INJECTION INTRAMUSCULAR; INTRAVENOUS EVERY 6 HOURS PRN
Status: DISCONTINUED | OUTPATIENT
Start: 2023-02-20 | End: 2023-02-25 | Stop reason: HOSPADM

## 2023-02-20 RX ORDER — MORPHINE SULFATE 4 MG/ML
5 INJECTION, SOLUTION INTRAMUSCULAR; INTRAVENOUS
Status: DISCONTINUED | OUTPATIENT
Start: 2023-02-20 | End: 2023-02-25 | Stop reason: HOSPADM

## 2023-02-20 RX ADMIN — ONDANSETRON 4 MG: 2 INJECTION INTRAMUSCULAR; INTRAVENOUS at 00:19

## 2023-02-20 RX ADMIN — GADOTERIDOL 14 ML: 279.3 INJECTION, SOLUTION INTRAVENOUS at 19:02

## 2023-02-20 RX ADMIN — HYDROCODONE BITARTRATE AND ACETAMINOPHEN 1 TABLET: 7.5; 325 TABLET ORAL at 09:22

## 2023-02-20 RX ADMIN — QUETIAPINE FUMARATE 100 MG: 25 TABLET ORAL at 21:22

## 2023-02-20 RX ADMIN — CEFTRIAXONE 2000 MG: 2 INJECTION, POWDER, FOR SOLUTION INTRAMUSCULAR; INTRAVENOUS at 00:27

## 2023-02-20 RX ADMIN — GABAPENTIN 300 MG: 300 CAPSULE ORAL at 09:22

## 2023-02-20 RX ADMIN — MORPHINE SULFATE 5 MG: 4 INJECTION, SOLUTION INTRAMUSCULAR; INTRAVENOUS at 02:46

## 2023-02-20 RX ADMIN — METHOCARBAMOL 500 MG: 500 TABLET ORAL at 09:22

## 2023-02-20 RX ADMIN — GABAPENTIN 300 MG: 300 CAPSULE ORAL at 15:11

## 2023-02-20 RX ADMIN — GABAPENTIN 300 MG: 300 CAPSULE ORAL at 21:22

## 2023-02-20 RX ADMIN — PANTOPRAZOLE SODIUM 40 MG: 40 TABLET, DELAYED RELEASE ORAL at 09:21

## 2023-02-20 RX ADMIN — MORPHINE SULFATE 5 MG: 4 INJECTION, SOLUTION INTRAMUSCULAR; INTRAVENOUS at 21:23

## 2023-02-20 RX ADMIN — VANCOMYCIN HYDROCHLORIDE 1750 MG: 10 INJECTION, POWDER, LYOPHILIZED, FOR SOLUTION INTRAVENOUS at 01:10

## 2023-02-20 RX ADMIN — MORPHINE SULFATE 5 MG: 4 INJECTION, SOLUTION INTRAMUSCULAR; INTRAVENOUS at 17:33

## 2023-02-20 RX ADMIN — METHOCARBAMOL 500 MG: 500 TABLET ORAL at 21:22

## 2023-02-20 RX ADMIN — ONDANSETRON 4 MG: 2 INJECTION INTRAMUSCULAR; INTRAVENOUS at 19:43

## 2023-02-20 RX ADMIN — ROSUVASTATIN 10 MG: 10 TABLET, FILM COATED ORAL at 21:22

## 2023-02-20 RX ADMIN — MORPHINE SULFATE 5 MG: 4 INJECTION, SOLUTION INTRAMUSCULAR; INTRAVENOUS at 12:59

## 2023-02-20 RX ADMIN — ENOXAPARIN SODIUM 40 MG: 100 INJECTION SUBCUTANEOUS at 09:22

## 2023-02-20 ASSESSMENT — PAIN DESCRIPTION - ORIENTATION
ORIENTATION: LEFT;RIGHT;LOWER
ORIENTATION: LEFT;LOWER;RIGHT;MID
ORIENTATION: LOWER;RIGHT
ORIENTATION: LEFT
ORIENTATION: MID;LOWER;RIGHT

## 2023-02-20 ASSESSMENT — PAIN DESCRIPTION - LOCATION
LOCATION: BACK

## 2023-02-20 ASSESSMENT — LIFESTYLE VARIABLES
HOW MANY STANDARD DRINKS CONTAINING ALCOHOL DO YOU HAVE ON A TYPICAL DAY: PATIENT DOES NOT DRINK
HOW OFTEN DO YOU HAVE A DRINK CONTAINING ALCOHOL: NEVER

## 2023-02-20 ASSESSMENT — PAIN SCALES - GENERAL
PAINLEVEL_OUTOF10: 10
PAINLEVEL_OUTOF10: 8
PAINLEVEL_OUTOF10: 10

## 2023-02-20 ASSESSMENT — PAIN DESCRIPTION - DESCRIPTORS
DESCRIPTORS: BURNING;ACHING
DESCRIPTORS: ACHING;DISCOMFORT;THROBBING

## 2023-02-20 ASSESSMENT — PAIN DESCRIPTION - PAIN TYPE
TYPE: ACUTE PAIN

## 2023-02-20 ASSESSMENT — PAIN DESCRIPTION - ONSET
ONSET: ON-GOING

## 2023-02-20 ASSESSMENT — PAIN DESCRIPTION - FREQUENCY
FREQUENCY: CONTINUOUS

## 2023-02-20 NOTE — PLAN OF CARE
Problem: Pain  Goal: Verbalizes/displays adequate comfort level or baseline comfort level  2/20/2023 1155 by Lacey Cruz RN  Outcome: Progressing     Problem: Safety - Adult  Goal: Free from fall injury  Outcome: Progressing     Problem: ABCDS Injury Assessment  Goal: Absence of physical injury  Outcome: Progressing

## 2023-02-20 NOTE — CARE COORDINATION
Here as observation ( and patient is aware )for back and abdominal pain. The patient was evaluated at outlying emergency department discharged home instructed to use Lidoderm patches, Robaxin as well as Tylenol. The patient does currently follow with pain management. The patient denies any bowel or bladder incontinence, saddle anesthesias or weakness of the bilateral lower extremities. Neurosurgery consult. Mri lumbar and thoracic spine. Met with patient to discuss role/transition of care. He lives with his wife in [address] [place] [address], His PCP is Dr Bird Wright and uses [facility] in [facility]. Owns  no DME and  NO HHC. Await MRI'S and PT/OT. His plan is home and family will transport. Electronically signed by Sean Beltran RN on 2/20/2023 at 9:09 AM

## 2023-02-20 NOTE — ED PROVIDER NOTES
[address]        Pt Name: Bebe Best  MRN: [id number]  [place] 1975  Date of evaluation: 2/19/2023  Provider: Jennifer Soler DO  PCP: Judi Ruiz MD  Note Started: 7:55 PM EST 2/19/23    CHIEF COMPLAINT       Chief Complaint   Patient presents with    Back Pain     Seen Thursday, middle of back hurts, abdominal pain since yesterday , left upper and lower quad pain, pt unable to sit up, seen at pain management- prescribed norco        HISTORY OF PRESENT ILLNESS: 1 or more Elements       Bebe Best is a 49 y.o. male who presents to the emergency department with a chief complaint of back pain. The patient states that he does have a history of chronic neck pain but has never had pain in his lumbar spine. Patient states he began with pain 2 weeks ago. He denies any known injury. The pain worsened over the last 2 days. Located in the low left lumbar spine spine and radiates to his anterior abdomen. This moderate in severity. Worse with movement. Nothing makes it better. The patient was evaluated at outlying emergency department discharged home instructed to use Lidoderm patches, Robaxin as well as Tylenol. The patient does currently follow with pain management. The patient denies any bowel or bladder incontinence, saddle anesthesias or weakness of the bilateral lower extremities. He does have history epidural injections his most recent was many years ago. He denies any history of IV drug abuse. Nursing Notes were all reviewed and agreed with or any disagreements were addressed in the HPI. REVIEW OF SYSTEMS :      Review of Systems   Constitutional:  Negative for fever. HENT:  Negative for congestion. Eyes:  Negative for redness. Respiratory:  Negative for shortness of breath. Cardiovascular:  Negative for chest pain.    Gastrointestinal:  Negative for abdominal pain, nausea and vomiting. Genitourinary:  Positive for flank pain. Negative for dysuria. Musculoskeletal:  Positive for back pain. Negative for arthralgias. Skin:  Negative for rash. Neurological:  Negative for dizziness. Psychiatric/Behavioral:  Negative for confusion. All other systems reviewed and are negative. Positives and Pertinent negatives as per HPI. SURGICAL HISTORY     Past Surgical History:   Procedure Laterality Date    CERVICAL FUSION  05/26/2015    C 3--T 1    NECK SURGERY      SPINAL FUSION      c 4-5-6       CURRENTMEDICATIONS       Previous Medications    ALBUTEROL-IPRATROPIUM (COMBIVENT RESPIMAT)  MCG/ACT AERS INHALER    Inhale 1 puff into the lungs every 6 hours    DEXAMETHASONE (DECADRON) 4 MG TABLET    Take 1 tablet by mouth 2 times daily (with meals) for 10 days    DULOXETINE (CYMBALTA) 30 MG EXTENDED RELEASE CAPSULE    Take 1 capsule by mouth daily    GABAPENTIN (NEURONTIN) 300 MG CAPSULE    Take 1 capsule by mouth 3 times daily for 30 days. IBUPROFEN (ADVIL;MOTRIN) 600 MG TABLET    Take 1 tablet by mouth 3 times daily as needed for Pain    LIDOCAINE (LIDODERM) 5 %    Place 1 patch onto the skin every 24 hours Place 1 patch onto the skin daily 12 hours on, 12 hours off. MEDICAL MARIJUANA    Take by mouth as needed. METHOCARBAMOL (ROBAXIN) 500 MG TABLET    Take 1 tablet by mouth in the morning and at bedtime for 10 days Do not drive on this medication it causes severe impairment most people just take this at night    OMEPRAZOLE (PRILOSEC) 20 MG DELAYED RELEASE CAPSULE    Take 1 capsule by mouth daily    OXYCODONE-ACETAMINOPHEN (PERCOCET) 5-325 MG PER TABLET    Take 1 tablet by mouth as needed for Pain.      QUETIAPINE (SEROQUEL) 50 MG TABLET    Take 1 tablet by mouth 3 times daily    ROSUVASTATIN (CRESTOR) 40 MG TABLET    take 1 tablet by mouth once daily    TIZANIDINE (ZANAFLEX) 4 MG TABLET    Take 1 tablet by mouth every 8 hours as needed (neck spasms) ALLERGIES     Patient has no known allergies. FAMILYHISTORY       Family History   Problem Relation Age of Onset    Stroke Father         SOCIAL HISTORY       Social History     Tobacco Use    Smoking status: Never    Smokeless tobacco: Never   Substance Use Topics    Alcohol use: Yes     Comment: social    Drug use: Yes     Types: Marijuana (Weed)     Comment: medical       SCREENINGS        Jae Coma Scale  Eye Opening: Spontaneous  Best Verbal Response: Oriented  Best Motor Response: Obeys commands  Jae Coma Scale Score: 15                CIWA Assessment  BP: (!) 136/93  Heart Rate: 86           PHYSICAL EXAM  1 or more Elements     ED Triage Vitals   BP Temp Temp src Heart Rate Resp SpO2 Height Weight   02/19/23 1719 02/19/23 1719 -- 02/19/23 1719 02/19/23 1719 02/19/23 1719 02/19/23 1720 --   (!) 149/81 97.8 °F (36.6 °C)  88 18 98 % 5' 8\" (1.727 m)          Constitutional/General: Alert and oriented x3, well appearing, non toxic in NAD  Head: NC/AT  Eyes:  EOMI  Mouth: Oropharynx clear, handling secretions, no trismus  Neck: Supple, full ROM  Pulmonary: Lungs clear to auscultation bilaterally, no wheezes, rales, or rhonchi. Not in respiratory distress  Cardiovascular:  Regular rate and rhythm, no murmurs, gallops, or rubs. 2+ distal pulses  Abdomen: Soft, mild left lower quadrant abdominal tenderness, no rebound, rigidity or guarding  Extremities: Moves all extremities x 4. Warm and well perfused, there is no cervical, thoracic or lumbar spine tenderness to palpation. No overlying erythema, warm to touch or cellulitic changes, no overlying rashes  Skin: warm and dry without rash  Neurologic: GCS 15, no gross focal neurologic deficits  Psych: Normal Affect      DIAGNOSTIC RESULTS     I have personally reviewed all laboratory and imaging results for this patient. Results are listed below.      LABS:  Results for orders placed or performed during the hospital encounter of 02/19/23   CBC with Auto Differential   Result Value Ref Range    WBC 14.1 (H) 4.5 - 11.5 E9/L    RBC 5.24 3.80 - 5.80 E12/L    Hemoglobin 15.5 12.5 - 16.5 g/dL    Hematocrit 45.0 37.0 - 54.0 %    MCV 85.9 80.0 - 99.9 fL    MCH 29.6 26.0 - 35.0 pg    MCHC 34.4 32.0 - 34.5 %    RDW 12.2 11.5 - 15.0 fL    Platelets 345 643 - 910 E9/L    MPV 10.0 7.0 - 12.0 fL    Neutrophils % 69.6 43.0 - 80.0 %    Immature Granulocytes % 0.4 0.0 - 5.0 %    Lymphocytes % 22.8 20.0 - 42.0 %    Monocytes % 7.1 2.0 - 12.0 %    Eosinophils % 0.0 0.0 - 6.0 %    Basophils % 0.1 0.0 - 2.0 %    Neutrophils Absolute 9.84 (H) 1.80 - 7.30 E9/L    Immature Granulocytes # 0.06 E9/L    Lymphocytes Absolute 3.22 1.50 - 4.00 E9/L    Monocytes Absolute 1.01 (H) 0.10 - 0.95 E9/L    Eosinophils Absolute 0.00 (L) 0.05 - 0.50 E9/L    Basophils Absolute 0.01 0.00 - 0.20 E9/L   Comprehensive Metabolic Panel   Result Value Ref Range    Sodium 139 132 - 146 mmol/L    Potassium 3.7 3.5 - 5.0 mmol/L    Chloride 104 98 - 107 mmol/L    CO2 21 (L) 22 - 29 mmol/L    Anion Gap 14 7 - 16 mmol/L    Glucose 102 (H) 74 - 99 mg/dL    BUN 22 (H) 6 - 20 mg/dL    Creatinine 0.9 0.7 - 1.2 mg/dL    Est, Glom Filt Rate >60 >=60 mL/min/1.73    Calcium 10.2 8.6 - 10.2 mg/dL    Total Protein 7.5 6.4 - 8.3 g/dL    Albumin 4.8 3.5 - 5.2 g/dL    Total Bilirubin 0.7 0.0 - 1.2 mg/dL    Alkaline Phosphatase 53 40 - 129 U/L    ALT 31 0 - 40 U/L    AST 31 0 - 39 U/L   Lactic Acid   Result Value Ref Range    Lactic Acid 3.2 (H) 0.5 - 2.2 mmol/L   Lipase   Result Value Ref Range    Lipase 55 13 - 60 U/L   Urinalysis   Result Value Ref Range    Color, UA Yellow Straw/Yellow    Clarity, UA Clear Clear    Glucose, Ur Negative Negative mg/dL    Bilirubin Urine Negative Negative    Ketones, Urine Negative Negative mg/dL    Specific Gravity, UA 1.015 1.005 - 1.030    Blood, Urine Negative Negative    pH, UA 7.0 5.0 - 9.0    Protein, UA TRACE Negative mg/dL    Urobilinogen, Urine 0.2 <2.0 E.U./dL    Nitrite, Urine Negative Negative    Leukocyte Esterase, Urine Negative Negative   Microscopic Urinalysis   Result Value Ref Range    WBC, UA 1-3 0 - 5 /HPF    RBC, UA NONE 0 - 2 /HPF    Bacteria, UA MODERATE (A) None Seen /HPF   Lactic Acid   Result Value Ref Range    Lactic Acid 1.6 0.5 - 2.2 mmol/L       RADIOLOGY:  Interpreted by Radiologist.  CTA CHEST W CONTRAST   Final Result   1. Normal thoracic aorta. 2. No evidence of pulmonary embolism or acute pulmonary abnormality. RECOMMENDATIONS:   Careful clinical correlation and follow up recommended. Unavailable         CTA ABDOMEN PELVIS W CONTRAST   Final Result   1. No acute arterial vascular disease. 2. Normal appendix. RECOMMENDATIONS:   Careful clinical correlation and follow up recommended. RADIOLOGY:   Non-plain film images such as CT, Ultrasound and MRI are read by the radiologist. Plain radiographic images are visualized and preliminarily interpreted by the ED Provider       Interpretation per the Radiologist below, if available at the time of this note:    CTA CHEST W CONTRAST   Final Result   1. Normal thoracic aorta. 2. No evidence of pulmonary embolism or acute pulmonary abnormality. RECOMMENDATIONS:   Careful clinical correlation and follow up recommended. Unavailable         CTA ABDOMEN PELVIS W CONTRAST   Final Result   1. No acute arterial vascular disease. 2. Normal appendix. RECOMMENDATIONS:   Careful clinical correlation and follow up recommended. CT LUMBAR SPINE WO CONTRAST    Result Date: 2/16/2023  EXAMINATION: CT OF THE LUMBAR SPINE WITHOUT CONTRAST  2/16/2023 TECHNIQUE: CT of the lumbar spine was performed without the administration of intravenous contrast. Multiplanar reformatted images are provided for review. Adjustment of mA and/or kV according to patient size was utilized.   Automated exposure control, iterative reconstruction, and/or weight based adjustment of the mA/kV was utilized to reduce the radiation dose to as low as reasonably achievable. COMPARISON: 06/29/2010 lumbar spine MRI and 06/11/2017 lumbar spine radiographs. HISTORY: ORDERING SYSTEM PROVIDED HISTORY: left lower back pain x 2 weeks got better then last 2 days worse, r/o left kidney stone please TECHNOLOGIST PROVIDED HISTORY: Reason for exam:->left lower back pain x 2 weeks got better then last 2 days worse, r/o left kidney stone please What reading provider will be dictating this exam?->CRC FINDINGS: BONES/ALIGNMENT: There are 5 lumbar vertebral bodies. The vertebral bodies are normal height. There is slight retrolisthesis of L5 on S1. Marrow signal is normal.  No fractures are identified. There are small marginal osteophytes at the anterior vertebral endplates. DEGENERATIVE CHANGES: At L2-3, there is circumferential disc bulge with mild narrowing of the lateral recesses. No spinal stenosis. At L3-4, there is circumferential disc bulge but no significant narrowing of the neural foramina. No spinal stenosis. At L4-5, there is circumferential disc bulge and mild narrowing of lateral recesses. No spinal stenosis. At L5-S1, there is mild disc space narrowing and circumferential disc bulge this is more prominent in the left paracentral to lateral aspect with mild narrowing of the left lateral recess and left neural foramen. No spinal stenosis. SOFT TISSUES/RETROPERITONEUM: There are no paraspinal masses. Images through the kidneys are without evidence of discrete renal calcifications. No hydronephrosis. Multilevel mild lumbar disc disease, most notably at L5-S1 as described. There is been some progression of disc disease at L5-S1 when compared to the previous lumbar spine MRI. No results found. PROCEDURES   Unless otherwise noted below, none       MDM:   IDr. Marcela am the primary physician of record.     Felipa Flores is a 49 y.o. male who presents to the ED for back pain  Vital signs upon arrival BP (!) 136/93   Pulse 86   Temp 97.8 °F (36.6 °C)   Resp 17   Ht 5' 8\" (1.727 m)   SpO2 97%   BMI 22.81 kg/m²     History From: The patient as well as patient's    Independent Historian: None    Limitations to history: None      History: The patient presents to the emergency department with a chief complaint of back pain. The patient states that he does have a history of chronic neck pain but has never had pain in his lumbar spine. Patient states he began with pain 2 weeks ago. He denies any known injury. The pain worsened over the last 2 days. Located in the low left lumbar spine spine and radiates to his anterior abdomen. This moderate in severity. Worse with movement. Nothing makes it better. The patient was evaluated at outlying emergency department discharged home instructed to use Lidoderm patches, Robaxin as well as Tylenol. The patient does currently follow with pain management. The patient denies any bowel or bladder incontinence, saddle anesthesias or weakness of the bilateral lower extremities. He does have history epidural injections his most recent was many years ago. He denies any history of IV drug abuse. Physical exam: Patient sitting in the bed in no acute distress. Heart regular rate and rhythm, lungs clear to auscultation bilaterally patient does have mild tenderness to palpation over the left flank, patient has a mild anterior abdominal tenderness, no rebound, rigidity or guarding.   5 out of 5 muscle strength of the bilateral upper and lower extremities sensation to gross touch intact in the bilateral upper and lower extremities    Differential diagnosis includes but not limited to :  Lumbar radiculopathy-patient did have CT demonstrating degenerative disc disease  Colitis-not seen on imaging  Diverticulitis-not seen on  Aortic aneurysm-not seen on image  Epidural abscess-patient with leukocytosis and low back pain no red flag symptoms or cauda equina-patient given IV vancomycin and Rocephin    Records reviewed: Reviewed patient's prior ED visit he did have CT lumbar spine demonstrating degenerative disc disease most notably at L5-S1    Diagnostic Considerations :   Did consider MRI of the lumbar spine. The patient has no red flag symptoms for cauda equina. At this time we will cover with IV antibiotics patient can have MRI in the morning. Patient treated with   Medications   vancomycin (VANCOCIN) 1,750 mg in sodium chloride 0.9 % 500 mL IVPB (has no administration in time range)   cefTRIAXone (ROCEPHIN) 2,000 mg in sterile water 20 mL IV syringe (has no administration in time range)   ondansetron (ZOFRAN) injection 4 mg (has no administration in time range)   fentaNYL (SUBLIMAZE) injection 50 mcg (50 mcg IntraVENous Given 2/19/23 2013)   0.9 % sodium chloride bolus (0 mLs IntraVENous Stopped 2/19/23 2042)   iopamidol (ISOVUE-370) 76 % injection 90 mL (90 mLs IntraVENous Given 2/19/23 2101)   dexamethasone (DECADRON) injection 4 mg/mL (10 mg IntraVENous Given 2/19/23 2239)   ketamine 50 mg/5 mL injection syringe (13.6 mg IntraVENous Given 2/19/23 2327)   ketorolac (TORADOL) injection 30 mg (30 mg IntraVENous Given 2/19/23 2327)         Labs independently interpreted and reviewed by me demonstrate :  [id number]  CMP fairly unremarkable  Lactic acid initially elevated at 3.2-patient given IV fluids and improved to 1.6  Urinalysis negative    Imaging reviewed and interpreted by me demonstrates:  CTA chest abdomen pelvis demonstrates no acute process      Discussions with other health professionals: Spoke with Dr. Barbara Burnette discussed case. He will accept the patient for admission. Chronic conditions:   Past Medical History:   Diagnosis Date    Anxiety     HIGH CHOLESTEROL     Osteoarthritis        CONSULTS: Internal medicine    Risk/Disposition: Patient presenting emergency department with a chief complaint of back pain been going on for the last 2 weeks.   He has no red flag symptoms for cauda equina. The patient did have CT at outlying emergency department demonstrating degenerative disc disease at L5-S1. The patient did have labs and imaging which were reviewed. He was found have leukocytosis of 14.1 he did have CTA of the chest abdomen pelvis which was negative for any acute process. Patient did have persistent back pain despite IV treatment in the emergency department. Due to patient's leukocytosis persistent back pain the patient was covered with antibiotics for possible epidural abscess. The patient will be admitted for further treatment and evaluation. EMERGENCY DEPARTMENT COURSE    Vitals:    Vitals:    02/19/23 1719 02/19/23 1720 02/19/23 2152 02/19/23 2331   BP: (!) 149/81  114/69 (!) 136/93   Pulse: 88  71 86   Resp: 18 19 17   Temp: 97.8 °F (36.6 °C)      SpO2: 98%  99% 97%   Height:  5' 8\" (1.727 m)         ED Course as of 02/20/23 0011   Sun Feb 19, 2023 2321 Patient also complaining of pain. We will remedicated the patient. [MT]   5 Spoke with Dr. Jillian Ayala he will accept the patient for admission [MT]      ED Course User Index  [MT] Cherrie Epps DO          I am the Primary Clinician of Record. FINAL IMPRESSION      1. Acute left-sided low back pain without sciatica    2. Leukocytosis, unspecified type    3. Lactic acidosis          DISPOSITION/PLAN      Admitted 02/20/2023 12:02:41 AM    Patient's disposition: Admit to med/surg floor  Patient's condition is stable.            (Please note that portions of this note were completed with a voice recognition program.  Efforts were made to edit the dictations but occasionally words are mis-transcribed.)    Cherrie Epps DO (electronically signed)       Cherrie Epps DO  02/20/23 0011

## 2023-02-20 NOTE — CONSULTS
[address]   Chief Complaint   Patient presents with    Back Pain     Seen Thursday, middle of back hurts, abdominal pain since yesterday , left upper and lower quad pain, pt unable to sit up, seen at pain management- prescribed norco    .     Chief Complaint: low back pain    HPI:   Rahul Garcia is a 49 y.o.  male who has history of low back and right leg pain x 2 weeks. The back pain is severe in intensity and achy in character. The right leg pain is mild. No leg numbness or weakness. No B or B dysfunction. No relief with NSAIDS, gabapentin, or marijuana.      Past Medical History:   Diagnosis Date    Anxiety     HIGH CHOLESTEROL     Osteoarthritis      Past Surgical History:   Procedure Laterality Date    CERVICAL FUSION  05/26/2015    C 3--T 1    NECK SURGERY      SPINAL FUSION      c 4-5-6      Family History   Problem Relation Age of Onset    Stroke Father       Social History     Socioeconomic History    Marital status:      Spouse name: Not on file    Number of children: Not on file    Years of education: Not on file    Highest education level: Not on file   Occupational History    Not on file   Tobacco Use    Smoking status: Never    Smokeless tobacco: Never   Substance and Sexual Activity    Alcohol use: Yes     Comment: social    Drug use: Yes     Types: Marijuana Dietra Due)     Comment: medical    Sexual activity: Yes   Other Topics Concern    Not on file   Social History Narrative    Not on file     Social Determinants of Health     Financial Resource Strain: Not on file   Food Insecurity: Not on file   Transportation Needs: Not on file   Physical Activity: Not on file   Stress: Not on file   Social Connections: Not on file   Intimate Partner Violence: Not on file   Housing Stability: Not on file       Medications:   Current Facility-Administered Medications   Medication Dose Route Frequency Provider Last Rate Last Admin    ondansetron (Jose Antonio Yusuf) injection 4 mg  4 mg IntraVENous Q6H PRN [place] Norse, DO   4 mg at 02/20/23 0019    morphine (PF) injection 5 mg  5 mg IntraVENous Q3H PRN Alethea Ruiz MD   5 mg at 02/20/23 0246    gabapentin (NEURONTIN) capsule 300 mg  300 mg Oral TID Alethea Ruiz MD   300 mg at 02/20/23 7018    HYDROcodone-acetaminophen ([address]) 7.5-325 MG per tablet 1 tablet  1 tablet Oral TID PRN Alethea Ruiz MD   1 tablet at 02/20/23 8865    ibuprofen (ADVIL;MOTRIN) tablet 600 mg  600 mg Oral TID PRN Alethea Ruiz MD        methocarbamol (ROBAXIN) tablet 500 mg  500 mg Oral BID Alethea Ruiz MD   500 mg at 02/20/23 6137    pantoprazole (PROTONIX) tablet 40 mg  40 mg Oral QAM AC Alethea Ruiz MD   40 mg at 02/20/23 3278    QUEtiapine (SEROQUEL) tablet 50 mg  50 mg Oral TID Alethea Ruiz MD        rosuvastatin (CRESTOR) tablet 10 mg  10 mg Oral Nightly Alethea Ruiz MD        enoxaparin (LOVENOX) injection 40 mg  40 mg SubCUTAneous Daily Alethea Ruiz MD   40 mg at 02/20/23 0087    ipratropium-albuterol (DUONEB) nebulizer solution 1 ampule  1 ampule Inhalation 4x daily Alethea Ruiz MD            Allergies:    Patient has no known allergies. Review of Systems     Physical Exam     /89   Pulse 71   Temp 98.1 °F (36.7 °C) (Temporal)   Resp 18   Ht 5' 8\" (1.727 m)   SpO2 97%   BMI 22.81 kg/m²    Physical Exam   Constitutional: Appears well-nourished. Head: Normocephalic and atraumatic. Eyes: EOM are normal. Pupils are equal, round, and reactive to light. Neck: Normal range of motion. No tracheal deviation present. Cardiovascular: Normal rate. Pulmonary/Chest: No stridor. Abdominal: No distension. Neurological:   Oriented to person, place, and time. CN 3-12 intact  Motor strength full  Sensation intact to light touch   Skin: Skin is warm and dry.    Psychiatric: Thought content normal.      Assessment:   49year old male with 2 week history of intractable LBP and mild right leg pain    Plan:  Await lumbar MRI and x-rays      Electronically signed by LUCIA Kumar on 2/20/2023 at 9:58 AM     I have interviewed and examined the patient and agree with above. He has left sided low back pain. Will get hip x-rays, flex-ex lumbar films and MRI.   I spent over 50 mins ion medical decision making and in discussing his condition with him    Clemente Benjamin MD

## 2023-02-20 NOTE — ED NOTES
Handoff report given to ED RN Esequiel Godfrey taking over patient care.       Roe Awan RN  02/19/23 9300

## 2023-02-20 NOTE — H&P
Ricardo Jenkins MD FACP  Internal medicine   History and Physical      CHIEF COMPLAINT: Left lumbar pain intractable      HISTORY OF PRESENT ILLNESS:    Patient was seen on the floor earlier this morning at the main campus of [facility]  Data reviewed in detail with the patient  Spoke with the ER physician at the time of admission  47-year-old  man currently disabled from several C-spine surgeries for degenerative disc disease  Presented with 2-week history of left lumbar pain radiating into the buttock  No history of trauma  Failed on conservative management with nonsteroidals etc. as an outpatient  Admitted for further management  No incontinence issues  Oral intake adequate  No sensory deficits    Past Medical History:    Past Medical History:   Diagnosis Date    Anxiety     HIGH CHOLESTEROL     Osteoarthritis        Past Surgical History:    Past Surgical History:   Procedure Laterality Date    CERVICAL FUSION  05/26/2015    C 3--T 1    NECK SURGERY      SPINAL FUSION      c 4-5-6       Medications Prior to Admission:    Medications Prior to Admission: HYDROcodone-acetaminophen (NORCO) 7.5-325 MG per tablet, Take 1 tablet by mouth 3 times daily as needed for Pain.  lidocaine (LIDODERM) 5 %, Place 1 patch onto the skin every 24 hours Place 1 patch onto the skin daily 12 hours on, 12 hours off.  ibuprofen (ADVIL;MOTRIN) 600 MG tablet, Take 1 tablet by mouth 3 times daily as needed for Pain  methocarbamol (ROBAXIN) 500 MG tablet, Take 1 tablet by mouth in the morning and at bedtime for 10 days Do not drive on this medication it causes severe impairment most people just take this at night  dexamethasone (DECADRON) 4 MG tablet, Take 1 tablet by mouth 2 times daily (with meals) for 10 days  rosuvastatin (CRESTOR) 40 MG tablet, take 1 tablet by mouth once daily  QUEtiapine (SEROQUEL) 50 MG tablet, Take 1 tablet by mouth 3 times daily  tiZANidine (ZANAFLEX) 4 MG tablet, Take 1 tablet by mouth every 8 hours  as needed (neck spasms)  gabapentin (NEURONTIN) 300 MG capsule, Take 1 capsule by mouth 3 times daily for 30 days. medical marijuana, Take by mouth as needed. [DISCONTINUED] DULoxetine (CYMBALTA) 30 MG extended release capsule, Take 1 capsule by mouth daily  omeprazole (PRILOSEC) 20 MG delayed release capsule, Take 1 capsule by mouth daily (Patient taking differently: Take 20 mg by mouth daily as needed )  [DISCONTINUED] oxyCODONE-acetaminophen (PERCOCET) 5-325 MG per tablet, Take 1 tablet by mouth as needed for Pain. albuterol-ipratropium (COMBIVENT RESPIMAT)  MCG/ACT AERS inhaler, Inhale 1 puff into the lungs every 6 hours    Allergies:    Patient has no known allergies. Social History:    reports that he has never smoked. He has never used smokeless tobacco. He reports current alcohol use. He reports current drug use. Drug: Marijuana Manuel Goldberg). Family History:   family history includes Stroke in his father. REVIEW OF SYSTEMS:  As above in the HPI, otherwise negative    PHYSICAL EXAM:    Vitals:  /89   Pulse 71   Temp 98.1 °F (36.7 °C) (Temporal)   Resp 18   Ht 5' 8\" (1.727 m)   SpO2 97%   BMI 22.81 kg/m²     General:  Awake, alert, oriented X 3. Well developed, well nourished, well groomed. No apparent distress. HEENT:  Normocephalic, atraumatic. Pupils equal, round, reactive to light. No scleral icterus. No conjunctival injection. Normal lips, teeth, and gums. No nasal discharge. Neck:  Supple  Heart:  RRR, no murmurs, gallops, rubs  Lungs:  CTA bilaterally, bilat symmetrical expansion, no wheeze, rales, or rhonchi  Abdomen:   Bowel sounds present, soft, nontender, no masses, no organomegaly, no peritoneal signs  Extremities:  No clubbing, cyanosis, or edema  Skin:  Warm and dry, no open lesions or rash  Neuro:  Cranial nerves 2-12 intact,  Straight leg raising on the left was positive at 60 degrees  Generalized low extremity weakness noted with atrophy  Breast: deferred  Rectal: deferred  Genitalia:  deferred    LABS:        ASSESSMENT:      Principal Problem:    Intractable back pain  Lumbar radiculopathy      PLAN:  Admit  Pain control  Resume home medications  MRI of the lumbar spine  May need neurosurgical intervention  Spoke with the neurosurgery this morning  Questions answered to patient's Elzbieta Potter MD  2:49 PM  2/20/2023

## 2023-02-21 PROBLEM — M54.16 LUMBAR RADICULOPATHY: Status: ACTIVE | Noted: 2023-02-21

## 2023-02-21 LAB
ACINETOBACTER CALCOAC BAUMANNII COMPLEX BY PCR: NOT DETECTED
BACTEROIDES FRAGILIS BY PCR: NOT DETECTED
BOTTLE TYPE: ABNORMAL
CANDIDA ALBICANS BY PCR: NOT DETECTED
CANDIDA AURIS BY PCR: NOT DETECTED
CANDIDA GLABRATA BY PCR: NOT DETECTED
CANDIDA KRUSEI BY PCR: NOT DETECTED
CANDIDA PARAPSILOSIS BY PCR: NOT DETECTED
CANDIDA TROPICALIS BY PCR: NOT DETECTED
CRYPTOCOCCUS NEOFORMANS/GATTII BY PCR: NOT DETECTED
ENTEROBACTER CLOACAE COMPLEX BY PCR: NOT DETECTED
ENTEROBACTERALES BY PCR: NOT DETECTED
ENTEROCOCCUS FAECALIS BY PCR: NOT DETECTED
ENTEROCOCCUS FAECIUM BY PCR: NOT DETECTED
ESCHERICHIA COLI BY PCR: NOT DETECTED
HAEMOPHILUS INFLUENZAE BY PCR: NOT DETECTED
INR BLD: 1.1
KLEBSIELLA AEROGENES BY PCR: NOT DETECTED
KLEBSIELLA OXYTOCA BY PCR: NOT DETECTED
KLEBSIELLA PNEUMONIAE GROUP BY PCR: NOT DETECTED
LISTERIA MONOCYTOGENES BY PCR: NOT DETECTED
METHICILLIN RESISTANCE MECA/C  BY PCR: NOT DETECTED
NEISSERIA MENINGITIDIS BY PCR: NOT DETECTED
ORDER NUMBER: ABNORMAL
PROTEUS SPECIES BY PCR: NOT DETECTED
PROTHROMBIN TIME: 11.7 SEC (ref 9.3–12.4)
PSEUDOMONAS AERUGINOSA BY PCR: NOT DETECTED
SALMONELLA SPECIES BY PCR: NOT DETECTED
SERRATIA MARCESCENS BY PCR: NOT DETECTED
SOURCE OF BLOOD CULTURE: ABNORMAL
STAPHYLOCOCCUS AUREUS BY PCR: NOT DETECTED
STAPHYLOCOCCUS EPIDERMIDIS BY PCR: DETECTED
STAPHYLOCOCCUS LUGDUNENSIS BY PCR: DETECTED
STAPHYLOCOCCUS SPECIES BY PCR: DETECTED
STENOTROPHOMONAS MALTOPHILIA BY PCR: NOT DETECTED
STREPTOCOCCUS AGALACTIAE BY PCR: NOT DETECTED
STREPTOCOCCUS PNEUMONIAE BY PCR: NOT DETECTED
STREPTOCOCCUS PYOGENES  BY PCR: NOT DETECTED
STREPTOCOCCUS SPECIES BY PCR: NOT DETECTED

## 2023-02-21 PROCEDURE — 6370000000 HC RX 637 (ALT 250 FOR IP): Performed by: INTERNAL MEDICINE

## 2023-02-21 PROCEDURE — 2580000003 HC RX 258: Performed by: INTERNAL MEDICINE

## 2023-02-21 PROCEDURE — 96376 TX/PRO/DX INJ SAME DRUG ADON: CPT

## 2023-02-21 PROCEDURE — 36415 COLL VENOUS BLD VENIPUNCTURE: CPT

## 2023-02-21 PROCEDURE — 87040 BLOOD CULTURE FOR BACTERIA: CPT

## 2023-02-21 PROCEDURE — 1200000000 HC SEMI PRIVATE

## 2023-02-21 PROCEDURE — 6360000002 HC RX W HCPCS: Performed by: INTERNAL MEDICINE

## 2023-02-21 RX ADMIN — HYDROCODONE BITARTRATE AND ACETAMINOPHEN 1 TABLET: 7.5; 325 TABLET ORAL at 08:44

## 2023-02-21 RX ADMIN — GABAPENTIN 300 MG: 300 CAPSULE ORAL at 14:51

## 2023-02-21 RX ADMIN — METHOCARBAMOL 500 MG: 500 TABLET ORAL at 08:41

## 2023-02-21 RX ADMIN — GABAPENTIN 300 MG: 300 CAPSULE ORAL at 08:41

## 2023-02-21 RX ADMIN — METHOCARBAMOL 500 MG: 500 TABLET ORAL at 19:38

## 2023-02-21 RX ADMIN — HYDROCODONE BITARTRATE AND ACETAMINOPHEN 1 TABLET: 7.5; 325 TABLET ORAL at 18:13

## 2023-02-21 RX ADMIN — VANCOMYCIN HYDROCHLORIDE 1250 MG: 10 INJECTION, POWDER, LYOPHILIZED, FOR SOLUTION INTRAVENOUS at 15:54

## 2023-02-21 RX ADMIN — MORPHINE SULFATE 5 MG: 4 INJECTION, SOLUTION INTRAMUSCULAR; INTRAVENOUS at 05:59

## 2023-02-21 RX ADMIN — MORPHINE SULFATE 5 MG: 4 INJECTION, SOLUTION INTRAMUSCULAR; INTRAVENOUS at 14:51

## 2023-02-21 RX ADMIN — QUETIAPINE FUMARATE 100 MG: 25 TABLET ORAL at 19:37

## 2023-02-21 RX ADMIN — ROSUVASTATIN 10 MG: 10 TABLET, FILM COATED ORAL at 19:37

## 2023-02-21 RX ADMIN — GABAPENTIN 300 MG: 300 CAPSULE ORAL at 19:37

## 2023-02-21 RX ADMIN — MORPHINE SULFATE 5 MG: 4 INJECTION, SOLUTION INTRAMUSCULAR; INTRAVENOUS at 09:56

## 2023-02-21 ASSESSMENT — PAIN SCALES - GENERAL
PAINLEVEL_OUTOF10: 10
PAINLEVEL_OUTOF10: 0

## 2023-02-21 ASSESSMENT — PAIN DESCRIPTION - LOCATION
LOCATION: BACK

## 2023-02-21 ASSESSMENT — PAIN DESCRIPTION - ORIENTATION
ORIENTATION: MID;LOWER
ORIENTATION: MID;LOWER
ORIENTATION: LOWER;MID
ORIENTATION: LOWER;MID
ORIENTATION: LOWER
ORIENTATION: LOWER

## 2023-02-21 ASSESSMENT — PAIN DESCRIPTION - DESCRIPTORS
DESCRIPTORS: ACHING;CRAMPING;SQUEEZING
DESCRIPTORS: ACHING;CRAMPING;POUNDING
DESCRIPTORS: DISCOMFORT;SORE;SHARP
DESCRIPTORS: ACHING;DISCOMFORT;THROBBING
DESCRIPTORS: ACHING;DISCOMFORT;SORE
DESCRIPTORS: DISCOMFORT;SORE;SHARP

## 2023-02-21 ASSESSMENT — PAIN DESCRIPTION - PAIN TYPE
TYPE: ACUTE PAIN

## 2023-02-21 ASSESSMENT — PAIN - FUNCTIONAL ASSESSMENT
PAIN_FUNCTIONAL_ASSESSMENT: PREVENTS OR INTERFERES SOME ACTIVE ACTIVITIES AND ADLS
PAIN_FUNCTIONAL_ASSESSMENT: PREVENTS OR INTERFERES SOME ACTIVE ACTIVITIES AND ADLS
PAIN_FUNCTIONAL_ASSESSMENT: ACTIVITIES ARE NOT PREVENTED
PAIN_FUNCTIONAL_ASSESSMENT: PREVENTS OR INTERFERES SOME ACTIVE ACTIVITIES AND ADLS

## 2023-02-21 ASSESSMENT — PAIN DESCRIPTION - FREQUENCY: FREQUENCY: INTERMITTENT

## 2023-02-21 ASSESSMENT — PAIN DESCRIPTION - ONSET: ONSET: ON-GOING

## 2023-02-21 NOTE — CARE COORDINATION
Social Work/Case Management Transition of Care Planning (Dario Raghu [place] [phone]): Per report, neurosurgery is following. MRI of lumbar and thoracic spine has been completed. Per neurosurgery note, MRI was reviewed. No significant stenosis on MRI. Plan is to try lumbar JOHANA. No surgical intervention is planned at this time. Conservative treatments are recommended. Discharge plan is to home with no needs. Family to transport. CM/SW will follow.   PATRICIA Fragoso  2/21/2023

## 2023-02-21 NOTE — PROGRESS NOTES
Dr. Walter Camarillo aware patient has positive blood cultures and lumbar JOHANA is on hold at this time.

## 2023-02-21 NOTE — PROGRESS NOTES
Department of Neurosurgery  Progress Note    CHIEF COMPLAINT: low back pain     SUBJECTIVE:  continued low back pain and right leg pain    REVIEW OF SYSTEMS :  Constitutional: Negative for chills and fever. Neurological: Negative for dizziness, tremors and speech change.      OBJECTIVE:   VITALS:  /80   Pulse 68   Temp 97.9 °F (36.6 °C) (Oral)   Resp 16   Ht 5' 8\" (1.727 m)   SpO2 98%   BMI 22.81 kg/m²     PHYSICAL:  Neurologic: Alert and oriented x3; PERRL  Motor Exam:  Motor exam is symmetrical 5 out of 5 all extremities bilaterally  Sensory:  Sensory intact  Incision c/d/i      DATA:  CBC:   Lab Results   Component Value Date/Time    WBC 14.1 02/19/2023 05:29 PM    RBC 5.24 02/19/2023 05:29 PM    HGB 15.5 02/19/2023 05:29 PM    HCT 45.0 02/19/2023 05:29 PM    MCV 85.9 02/19/2023 05:29 PM    MCH 29.6 02/19/2023 05:29 PM    MCHC 34.4 02/19/2023 05:29 PM    RDW 12.2 02/19/2023 05:29 PM     02/19/2023 05:29 PM    MPV 10.0 02/19/2023 05:29 PM     BMP:    Lab Results   Component Value Date/Time     02/19/2023 05:29 PM    K 3.7 02/19/2023 05:29 PM     02/19/2023 05:29 PM    CO2 21 02/19/2023 05:29 PM    BUN 22 02/19/2023 05:29 PM    LABALBU 4.8 02/19/2023 05:29 PM    CREATININE 0.9 02/19/2023 05:29 PM    CALCIUM 10.2 02/19/2023 05:29 PM    GFRAA >60 07/31/2018 05:15 AM    LABGLOM >60 02/19/2023 05:29 PM    GLUCOSE 102 02/19/2023 05:29 PM     PT/INR:    Lab Results   Component Value Date/Time    PROTIME 11.7 02/20/2023 11:23 PM    INR 1.1 02/20/2023 11:23 PM     PTT:    Lab Results   Component Value Date/Time    APTT 28.9 05/22/2015 02:45 PM   [APTT}    Current Inpatient Medications  Current Facility-Administered Medications: ondansetron (ZOFRAN) injection 4 mg, 4 mg, IntraVENous, Q6H PRN  morphine (PF) injection 5 mg, 5 mg, IntraVENous, Q3H PRN  gabapentin (NEURONTIN) capsule 300 mg, 300 mg, Oral, TID  HYDROcodone-acetaminophen (NORCO) 7.5-325 MG per tablet 1 tablet, 1 tablet, Oral, TID PRN  methocarbamol (ROBAXIN) tablet 500 mg, 500 mg, Oral, BID  pantoprazole (PROTONIX) tablet 40 mg, 40 mg, Oral, QAM AC  rosuvastatin (CRESTOR) tablet 10 mg, 10 mg, Oral, Nightly  ipratropium-albuterol (DUONEB) nebulizer solution 1 ampule, 1 ampule, Inhalation, 4x daily  QUEtiapine (SEROQUEL) tablet 100 mg, 100 mg, Oral, Nightly    ASSESSMENT:   49year old male with 2 week history of intractable LBP and mild right leg pain    PLAN:  MRI reviewed. No significant stenosis on MRI  Will try lumbar JOHANA. Order placed  Pain management  No surgical intervention.  Conservative treatments recommended      Electronically signed by Star Deutsch PA-C on 2/21/2023 at 12:45 PM

## 2023-02-21 NOTE — PROGRESS NOTES
Pharmacy Consultation Note  (Antibiotic Dosing and Monitoring)    Initial consult date: 2/21/23  Consulting physician/provider: Hector Jean MD  Drug: Vancomycin  Indication: Bloodstream Infection    Age/  Gender Height Weight IBW  Allergy Information   47 y.o./male 5' 8\" (172.7 cm)    68 kg   Ideal body weight: 68.4 kg (150 lb 12.7 oz)   Patient has no known allergies. Renal Function:  Recent Labs     02/19/23  1729   BUN 22*   CREATININE 0.9     No intake or output data in the 24 hours ending 02/21/23 1337    Vancomycin Monitoring:  Trough:  No results for input(s): VANCOTROUGH in the last 72 hours. Random:  No results for input(s): VANCORANDOM in the last 72 hours. Recent Labs     02/20/23  0037   BLOODCULT2 Gram stain performed from blood culture bottle media  Gram positive cocci in clusters  *        Historical Cultures:  No results found for: [facility]  Recent Labs     02/20/23  0037   BC Gram stain performed from blood culture bottle media  Gram positive cocci in clusters  Previously positive blood culture called  *       Vancomycin Administration Times:  Recent vancomycin administrations                     vancomycin (VANCOCIN) 1,750 mg in sodium chloride 0.9 % 500 mL IVPB (mg) 1,750 mg New Bag 02/20/23 0110             Assessment:  Patient is a 49 y.o. male who has been initiated on vancomycin  Estimated Creatinine Clearance: 97 mL/min (based on SCr of 0.9 mg/dL). To dose vancomycin, pharmacy will be utilizing [employer] calculation software for goal AUC/JHONATHAN 400-600 mg/L-hr    Plan:  Give vancomycin 1250 mg IV x1 today.   Start vancomycin 1000 mg IV q12h tomorrow  Will check vancomycin levels when appropriate - tomorrow AM  Will continue to monitor renal function   Pharmacy to follow      [facility] 2/21/2023 1:37 PM

## 2023-02-21 NOTE — PROGRESS NOTES
Occupational Therapy    Date:2023  Patient Name: Elpidio Short  MRN: [id number]  : 1975  Room: [address] [place], [place] [address]     OT orders received and chart reviewed. OT eval on hold at this time pending lumbar MRI and x-rays. OT will follow and re-attempt eval as appropriate, pending Nsx POC, at a later time/date.     Eusebia Whaley, [address] Mohamed Ali Annabi OTR/L #[id number]

## 2023-02-21 NOTE — CONSULTS
[address] [place] [address] [facility]    Consultation Note     Admit Date: 2/19/2023  6:26 PM    Reason for Consult:   Bacteremia    Attending Physician:  Espinoza Cyr MD     Chief Complaint: Back pain    HISTORY OF PRESENT ILLNESS:   28-year-old male with history of several cervical spine surgeries with cervical spine implant, HLD presented with back pain. MRI of the thoracic and lumbar spine showed multilevel degenerative changes without any neuroforaminal stenosis. Neurosurgery consulted and did not recommend any neurosurgical intervention but rather recommended epidural steroid injections. Blood cultures are positive for Staphylococcus lugdunensis and Staphylococcus epidermidis and Streptococcus species. ID consulted for bacteremia. Past Medical History:        Diagnosis Date    Anxiety     HIGH CHOLESTEROL     Osteoarthritis      Past Surgical History:        Procedure Laterality Date    CERVICAL FUSION  05/26/2015    C 3--T 1    NECK SURGERY      SPINAL FUSION      c 4-5-6     Current Medications:   Scheduled Meds:   vancomycin  20 mg/kg IntraVENous Once    Followed by    Lela Hikes ON 2/22/2023] vancomycin  1,000 mg IntraVENous Q12H    gabapentin  300 mg Oral TID    methocarbamol  500 mg Oral BID    pantoprazole  40 mg Oral QAM AC    rosuvastatin  10 mg Oral Nightly    ipratropium-albuterol  1 ampule Inhalation 4x daily    QUEtiapine  100 mg Oral Nightly     Continuous Infusions:  PRN Meds:ondansetron, morphine, HYDROcodone-acetaminophen    Allergies:  Patient has no known allergies.     Social History:   Social History     Socioeconomic History    Marital status:      Spouse name: None    Number of children: None    Years of education: None    Highest education level: None   Tobacco Use    Smoking status: Never    Smokeless tobacco: Never   Substance and Sexual Activity    Alcohol use: Yes     Comment: social    Drug use: Yes     Types: Marijuana Velholden Blair)     Comment: medical Sexual activity: Yes     Tobacco: No  Alcohol: No  Pets: No  Travel: No    Family History:       Problem Relation Age of Onset    Stroke Father    . Otherwise non-pertinent to the chief complaint.    REVIEW OF SYSTEMS:    CONSTITUTIONAL:  No chills, fevers or night sweats. No loss of weight.  EYES:  No double vision or drainage from eyes, ears or throat.  HEENT:  No neck stiffness. No dysphagia. No drainage from eyes, ears or throat  RESPIRATORY:  No cough, productive sputum or hemoptysis.   CARDIOVASCULAR:  No chest pain, palpitations, orthopnea or dyspnea on exertion.  GASTROINTESTINAL:  No nausea, vomiting, diarrhea or constipation or hematochezia   GENITOURINARY:  No frequency burning dysuria or hematuria.  INTEGUMENT/BREAST:  No rash or breast masses.  HEMATOLOGIC/LYMPHATIC:  No lymphadenopathy or blood dyscrasics.   ALLERGIC/IMMUNOLOGIC:  No anaphylaxis.   ENDOCRINE:  No polyuria or polydipsia or temperature intolerance.    MUSCULOSKELETAL:  No myalgia or arthralgia.  Full ROM.  NEUROLOGICAL:  No focal motor sensory deficit.  BEHAVIOR/PSYCH:  No psychosis.     PHYSICAL EXAM:    Vitals:    /80   Pulse 68   Temp 97.9 °F (36.6 °C) (Oral)   Resp 18   Ht 5' 8\" (1.727 m)   SpO2 98%   BMI 22.81 kg/m²   Constitutional: The patient is awake, alert, and oriented.   Skin: Warm and dry. No rashes were noted. No jaundice.  HEENT: Eyes show round, and reactive pupils. Moist mucous membranes, no ulcerations, no thrush.   Neck: Supple to movements. No lymphadenopathy.   Chest: No use of accessory muscles to breathe. Symmetrical expansion. Auscultation reveals no wheezing, crackles, or rhonchi.   Cardiovascular: S1 and S2 are rhythmic and regular. No murmurs appreciated.   Abdomen: Positive bowel sounds to auscultation. Benign to palpation. No masses felt. No hepatosplenomegaly.  Genitourinary: Deferred  Extremities: No clubbing, no cyanosis, no edema.  Musculoskeletal: No tenderness or pain in palpation of the  cervical spine  Neurological: No focal neurodeficit, following commands  Lines: peripheral      CBC+dif:  Recent Labs     02/19/23  1729   WBC 14.1*   HGB 15.5   HCT 45.0   MCV 85.9      NEUTROABS 9.84*     No results found for: CRP  No results found for: CRPHS  No results found for: SEDRATE  Lab Results   Component Value Date    ALT 31 02/19/2023    AST 31 02/19/2023    ALKPHOS 53 02/19/2023    BILITOT 0.7 02/19/2023     Lab Results   Component Value Date/Time     02/19/2023 05:29 PM    K 3.7 02/19/2023 05:29 PM     02/19/2023 05:29 PM    CO2 21 02/19/2023 05:29 PM    BUN 22 02/19/2023 05:29 PM    CREATININE 0.9 02/19/2023 05:29 PM    GFRAA >60 07/31/2018 05:15 AM    LABGLOM >60 02/19/2023 05:29 PM    GLUCOSE 102 02/19/2023 05:29 PM    PROT 7.5 02/19/2023 05:29 PM    LABALBU 4.8 02/19/2023 05:29 PM    CALCIUM 10.2 02/19/2023 05:29 PM    BILITOT 0.7 02/19/2023 05:29 PM    ALKPHOS 53 02/19/2023 05:29 PM    AST 31 02/19/2023 05:29 PM    ALT 31 02/19/2023 05:29 PM       Lab Results   Component Value Date/Time    PROTIME 11.7 02/20/2023 11:23 PM    INR 1.1 02/20/2023 11:23 PM       No results found for: TSH    Lab Results   Component Value Date/Time    COLORU Yellow 02/19/2023 06:31 PM    PHUR 7.0 02/19/2023 06:31 PM    WBCUA 1-3 02/19/2023 06:31 PM    RBCUA NONE 02/19/2023 06:31 PM    BACTERIA MODERATE 02/19/2023 06:31 PM    CLARITYU Clear 02/19/2023 06:31 PM    SPECGRAV 1.015 02/19/2023 06:31 PM    LEUKOCYTESUR Negative 02/19/2023 06:31 PM    UROBILINOGEN 0.2 02/19/2023 06:31 PM    BILIRUBINUR Negative 02/19/2023 06:31 PM    BLOODU Negative 02/19/2023 06:31 PM    GLUCOSEU Negative 02/19/2023 06:31 PM       No results found for: EOR2FGZ, BEART, R0EHDGAO, PHART, THGBART, RRX6WKA, PO2ART, UDQ0LMS  Radiology:  MRI LUMBAR SPINE W WO CONTRAST   Final Result   THORACIC:      No fracture. No significant spinal or neural foraminal stenosis. No abnormal enhancement.       Partially imaged cervical spinal fusion hardware. LUMBAR:      No fracture. No significant spinal or neural foraminal stenosis. No abnormal enhancement. Multilevel degenerative changes, as described above. RECOMMENDATIONS:   Unavailable         MRI THORACIC SPINE W WO CONTRAST   Final Result   THORACIC:      No fracture. No significant spinal or neural foraminal stenosis. No abnormal enhancement. Partially imaged cervical spinal fusion hardware. LUMBAR:      No fracture. No significant spinal or neural foraminal stenosis. No abnormal enhancement. Multilevel degenerative changes, as described above. RECOMMENDATIONS:   Unavailable         XR LUMBAR SPINE FLEXION AND EXTENSION ONLY   Final Result   1. Slight retrolisthesis from L1-L4. No change in vertebral body position   on flexion or extension views. 2.  Mild endplate spondylosis in the mid lumbar spine. Mild facet arthrosis   at L5-S1.      3.  Mild bilateral hip osteoarthritis. No acute osseous findings seen about   the pelvis or hips. XR HIP 2-3 VW W PELVIS RIGHT   Final Result   1. Slight retrolisthesis from L1-L4. No change in vertebral body position   on flexion or extension views. 2.  Mild endplate spondylosis in the mid lumbar spine. Mild facet arthrosis   at L5-S1.      3.  Mild bilateral hip osteoarthritis. No acute osseous findings seen about   the pelvis or hips. CTA CHEST W CONTRAST   Final Result   1. Normal thoracic aorta. 2. No evidence of pulmonary embolism or acute pulmonary abnormality. RECOMMENDATIONS:   Careful clinical correlation and follow up recommended. Unavailable         CTA ABDOMEN PELVIS W CONTRAST   Final Result   1. No acute arterial vascular disease. 2. Normal appendix. RECOMMENDATIONS:   Careful clinical correlation and follow up recommended.              Microbiology:  Pending  Recent Labs     02/20/23  0037   BC Gram stain performed from blood culture bottle media  Gram positive cocci in clusters  Previously positive blood culture called  *     No results for input(s): ORG in the last 72 hours. Recent Labs     02/20/23  0037   BLOODCULT2 Gram stain performed from blood culture bottle media  Gram positive cocci in clusters  *     No results for input(s): STREPNEUMAGU in the last 72 hours. No results for input(s): LP1UAG in the last 72 hours. No results for input(s): ASO in the last 72 hours. No results for input(s): CULTRESP in the last 72 hours. Assessment:  Staph lugdunensis and epidermidis bacteremia  Patient has cervical implant  Coagulase-negative Staphylococcus and lugdunensis are known to infect joint/spine implants    Plan:    Repeat blood culture  Continue vancomycin  If repeat blood cultures are positive we need to find out the source of his coagulase-negative Staphylococcus and lugdunensis. We will continue to follow    Thank you for having us see this patient in consultation. I will be discussing this case with the treating physicians.       Electronically signed by Rodrigo Orozco MD on 2/21/2023 at 5:01 PM

## 2023-02-21 NOTE — PROCEDURES
Notified Phyllis Marinelli RN on 8WE that IR procedure cannot be done until blood cultures are negative per Dr. Yves Holcomb.

## 2023-02-21 NOTE — PROGRESS NOTES
Norma Richard MD [facility]  Internal medicine  Progress Notes      CHIEF COMPLAINT: Left lumbar pain intractable      HISTORY OF PRESENT ILLNESS:    2/20/2023  Patient was seen on the floor earlier this morning at the main campus of [address] reviewed in detail with the patient  Spoke with the ER physician at the time of admission  41-year-old  man currently disabled from several C-spine surgeries for degenerative disc disease  Presented with 2-week history of left lumbar pain radiating into the buttock  No history of trauma  Failed on conservative management with nonsteroidals etc. as an outpatient  Admitted for further management  No incontinence issues  Oral intake adequate  No sensory deficits  2/21/2023  Patient was seen on the floor earlier this morning  Essentially unchanged  MRI findings discussed with the patient which is essentially negative  Positive blood cultures reported  Past Medical History:    Past Medical History:   Diagnosis Date    Anxiety     HIGH CHOLESTEROL     Osteoarthritis        Past Surgical History:    Past Surgical History:   Procedure Laterality Date    CERVICAL FUSION  05/26/2015    C 3--T 1    NECK SURGERY      SPINAL FUSION      c 4-5-6       Medications Prior to Admission:    Medications Prior to Admission: HYDROcodone-acetaminophen (NORCO) 7.5-325 MG per tablet, Take 1 tablet by mouth 3 times daily as needed for Pain.  lidocaine (LIDODERM) 5 %, Place 1 patch onto the skin every 24 hours Place 1 patch onto the skin daily 12 hours on, 12 hours off.   ibuprofen (ADVIL;MOTRIN) 600 MG tablet, Take 1 tablet by mouth 3 times daily as needed for Pain  methocarbamol (ROBAXIN) 500 MG tablet, Take 1 tablet by mouth in the morning and at bedtime for 10 days Do not drive on this medication it causes severe impairment most people just take this at night  dexamethasone (DECADRON) 4 MG tablet, Take 1 tablet by mouth 2 times daily (with meals) for 10 days  rosuvastatin (CRESTOR) 40 MG tablet, take 1 tablet by mouth once daily  QUEtiapine (SEROQUEL) 50 MG tablet, Take 1 tablet by mouth 3 times daily  tiZANidine (ZANAFLEX) 4 MG tablet, Take 1 tablet by mouth every 8 hours as needed (neck spasms)  gabapentin (NEURONTIN) 300 MG capsule, Take 1 capsule by mouth 3 times daily for 30 days. medical marijuana, Take by mouth as needed. [DISCONTINUED] DULoxetine (CYMBALTA) 30 MG extended release capsule, Take 1 capsule by mouth daily  omeprazole (PRILOSEC) 20 MG delayed release capsule, Take 1 capsule by mouth daily (Patient taking differently: Take 20 mg by mouth daily as needed )  [DISCONTINUED] oxyCODONE-acetaminophen (PERCOCET) 5-325 MG per tablet, Take 1 tablet by mouth as needed for Pain. albuterol-ipratropium (COMBIVENT RESPIMAT)  MCG/ACT AERS inhaler, Inhale 1 puff into the lungs every 6 hours    Allergies:    Patient has no known allergies. Social History:    reports that he has never smoked. He has never used smokeless tobacco. He reports current alcohol use. He reports current drug use. Drug: Marijuana Nan Maria Elena). Family History:   family history includes Stroke in his father. REVIEW OF SYSTEMS:  As above in the HPI, otherwise negative    PHYSICAL EXAM:    Vitals:  /80   Pulse 68   Temp 97.9 °F (36.6 °C) (Oral)   Resp 16   Ht 5' 8\" (1.727 m)   SpO2 98%   BMI 22.81 kg/m²     General:  Awake, alert, oriented X 3. Well developed, well nourished, well groomed. No apparent distress. HEENT:  Normocephalic, atraumatic. Pupils equal, round, reactive to light. No scleral icterus. No conjunctival injection. Normal lips, teeth, and gums. No nasal discharge. Neck:  Supple  Heart:  RRR, no murmurs, gallops, rubs  Lungs:  CTA bilaterally, bilat symmetrical expansion, no wheeze, rales, or rhonchi  Abdomen:   Bowel sounds present, soft, nontender, no masses, no organomegaly, no peritoneal signs  Extremities:  No clubbing, cyanosis, or edema  Skin:  Warm and dry, no open lesions or rash  Neuro:  Cranial nerves 2-12 intact,  Straight leg raising on the left was positive at 60 degrees  Generalized low extremity weakness noted with atrophy  Breast: deferred  Rectal: deferred  Genitalia:  deferred    LABS:        ASSESSMENT:    Bacteremia  Principal Problem:    Intractable back pain  Lumbar radiculopathy      PLAN:  ID consult  Pain control  Resume home medications  MRI of the lumbar spine/negative findings  May need neurosurgical intervention  Spoke with the neurosurgery 2/20/2023  Questions answered to patient's Jimy Soni MD  11:41 AM  2/21/2023

## 2023-02-21 NOTE — PROGRESS NOTES
Physical Therapy    Date: 2023       Patient Name: Luzma Rashid  : 1975      MRN: [id number]    Physical therapy order received and chart reviewed. PT evaluation held at this time pending updated NS POC. Will follow up as appropriate.      Rosa Isela Kaplan PT, DPT  [id number]

## 2023-02-22 LAB
ALBUMIN SERPL-MCNC: 3.7 G/DL (ref 3.5–5.2)
ANION GAP SERPL CALCULATED.3IONS-SCNC: 11 MMOL/L (ref 7–16)
BUN BLDV-MCNC: 24 MG/DL (ref 6–20)
CALCIUM SERPL-MCNC: 9.1 MG/DL (ref 8.6–10.2)
CHLORIDE BLD-SCNC: 106 MMOL/L (ref 98–107)
CO2: 22 MMOL/L (ref 22–29)
CREAT SERPL-MCNC: 1 MG/DL (ref 0.7–1.2)
GFR SERPL CREATININE-BSD FRML MDRD: >60 ML/MIN/1.73
GLUCOSE BLD-MCNC: 98 MG/DL (ref 74–99)
PHOSPHORUS: 4.5 MG/DL (ref 2.5–4.5)
POTASSIUM SERPL-SCNC: 3.9 MMOL/L (ref 3.5–5)
SODIUM BLD-SCNC: 139 MMOL/L (ref 132–146)
VANCOMYCIN RANDOM: 7.1 MCG/ML (ref 5–40)

## 2023-02-22 PROCEDURE — 2580000003 HC RX 258: Performed by: INTERNAL MEDICINE

## 2023-02-22 PROCEDURE — 6360000002 HC RX W HCPCS: Performed by: INTERNAL MEDICINE

## 2023-02-22 PROCEDURE — 87040 BLOOD CULTURE FOR BACTERIA: CPT

## 2023-02-22 PROCEDURE — 36415 COLL VENOUS BLD VENIPUNCTURE: CPT

## 2023-02-22 PROCEDURE — 6370000000 HC RX 637 (ALT 250 FOR IP): Performed by: INTERNAL MEDICINE

## 2023-02-22 PROCEDURE — 80069 RENAL FUNCTION PANEL: CPT

## 2023-02-22 PROCEDURE — 97161 PT EVAL LOW COMPLEX 20 MIN: CPT

## 2023-02-22 PROCEDURE — 97165 OT EVAL LOW COMPLEX 30 MIN: CPT

## 2023-02-22 PROCEDURE — 1200000000 HC SEMI PRIVATE

## 2023-02-22 PROCEDURE — 97535 SELF CARE MNGMENT TRAINING: CPT

## 2023-02-22 PROCEDURE — 80202 ASSAY OF VANCOMYCIN: CPT

## 2023-02-22 PROCEDURE — 97530 THERAPEUTIC ACTIVITIES: CPT

## 2023-02-22 RX ADMIN — PANTOPRAZOLE SODIUM 40 MG: 40 TABLET, DELAYED RELEASE ORAL at 06:33

## 2023-02-22 RX ADMIN — MORPHINE SULFATE 5 MG: 4 INJECTION, SOLUTION INTRAMUSCULAR; INTRAVENOUS at 11:34

## 2023-02-22 RX ADMIN — VANCOMYCIN HYDROCHLORIDE 1000 MG: 1 INJECTION, POWDER, LYOPHILIZED, FOR SOLUTION INTRAVENOUS at 06:45

## 2023-02-22 RX ADMIN — HYDROCODONE BITARTRATE AND ACETAMINOPHEN 1 TABLET: 7.5; 325 TABLET ORAL at 06:33

## 2023-02-22 RX ADMIN — METHOCARBAMOL 500 MG: 500 TABLET ORAL at 09:15

## 2023-02-22 RX ADMIN — VANCOMYCIN HYDROCHLORIDE 1000 MG: 1 INJECTION, POWDER, LYOPHILIZED, FOR SOLUTION INTRAVENOUS at 18:53

## 2023-02-22 RX ADMIN — MORPHINE SULFATE 5 MG: 4 INJECTION, SOLUTION INTRAMUSCULAR; INTRAVENOUS at 20:37

## 2023-02-22 RX ADMIN — GABAPENTIN 300 MG: 300 CAPSULE ORAL at 13:02

## 2023-02-22 RX ADMIN — QUETIAPINE FUMARATE 100 MG: 25 TABLET ORAL at 20:28

## 2023-02-22 RX ADMIN — GABAPENTIN 300 MG: 300 CAPSULE ORAL at 20:28

## 2023-02-22 RX ADMIN — METHOCARBAMOL 500 MG: 500 TABLET ORAL at 20:29

## 2023-02-22 RX ADMIN — MORPHINE SULFATE 5 MG: 4 INJECTION, SOLUTION INTRAMUSCULAR; INTRAVENOUS at 15:58

## 2023-02-22 RX ADMIN — ROSUVASTATIN 10 MG: 10 TABLET, FILM COATED ORAL at 20:28

## 2023-02-22 RX ADMIN — GABAPENTIN 300 MG: 300 CAPSULE ORAL at 09:15

## 2023-02-22 ASSESSMENT — PAIN SCALES - GENERAL
PAINLEVEL_OUTOF10: 9
PAINLEVEL_OUTOF10: 10
PAINLEVEL_OUTOF10: 9
PAINLEVEL_OUTOF10: 10
PAINLEVEL_OUTOF10: 8
PAINLEVEL_OUTOF10: 10

## 2023-02-22 ASSESSMENT — PAIN DESCRIPTION - DESCRIPTORS
DESCRIPTORS: ACHING;BURNING;SHOOTING
DESCRIPTORS: ACHING;SHOOTING
DESCRIPTORS: THROBBING;SHOOTING;BURNING

## 2023-02-22 ASSESSMENT — PAIN DESCRIPTION - ONSET
ONSET: ON-GOING
ONSET: ON-GOING

## 2023-02-22 ASSESSMENT — PAIN DESCRIPTION - LOCATION
LOCATION: BACK

## 2023-02-22 ASSESSMENT — PAIN DESCRIPTION - FREQUENCY
FREQUENCY: INTERMITTENT
FREQUENCY: INTERMITTENT

## 2023-02-22 ASSESSMENT — PAIN - FUNCTIONAL ASSESSMENT
PAIN_FUNCTIONAL_ASSESSMENT: ACTIVITIES ARE NOT PREVENTED
PAIN_FUNCTIONAL_ASSESSMENT: ACTIVITIES ARE NOT PREVENTED

## 2023-02-22 ASSESSMENT — PAIN DESCRIPTION - ORIENTATION
ORIENTATION: LOWER
ORIENTATION: LOWER;LEFT
ORIENTATION: LOWER

## 2023-02-22 ASSESSMENT — PAIN DESCRIPTION - PAIN TYPE
TYPE: ACUTE PAIN
TYPE: ACUTE PAIN

## 2023-02-22 NOTE — PROGRESS NOTES
Physical Therapy    Physical Therapy Initial Assessment       Name: Stefanie De La Rosa  : 1975  MRN: [id number]      Date of Service: 2023    Evaluating PT:  Kaylin Dale PT, DPT  [id number]    Room #:  6160/3784-G  Diagnosis:  Lactic acidosis [E87.20]  Intractable back pain [M54.9]  Leukocytosis, unspecified type [D72.829]  Acute left-sided low back pain without sciatica [M54.50]  Lumbar radiculopathy [M54.16]  PMHx/PSHx:   has a past medical history of Anxiety, HIGH CHOLESTEROL, and Osteoarthritis. Procedure/Surgery:  [facility]   Precautions:    Equipment Needs:    Declined WW    SUBJECTIVE:    Pt lives with spouse in a 2 story home with first floor set up. Pt has 4 stairs to enter and no rail. Bed is on first floor and bath is on first floor. Pt ambulated with no device independently PTA. Equipment Owned:     OBJECTIVE:   Initial Evaluation  Date: 23 Treatment Short Term/ Long Term   Goals   AM-PAC 6 Clicks      Was pt agreeable to Eval/treatment? Yes     Does pt have pain? 10/10 L flank and low back     Bed Mobility  Rolling: SBA  Supine to sit: SBA  Sit to supine: SBA  Scooting: SBA  Rolling: Independent    Supine to sit:  Independent    Sit to supine: Independent    Scooting: Independent     Transfers Sit to stand: SBA  Stand to sit: SBA  Stand pivot: SBA  Sit to stand: Modified Independent    Stand to sit: Modified Independent    Stand pivot: Modified Independent     Ambulation    150 feet with SBA no AD  300 feet with Modified Independent  AAD    Stair negotiation: ascended and descended  NT  >4 steps with single rail Modified Independent     ROM BUE:  Defer to OT  BLE:  WFL     Strength BUE:  Defer to OT  BLE:  4/5  Improve 1 MMT   Balance Sitting EOB:  SBA  Dynamic Standing:  SBA no AD  Sitting EOB:  Independent    Dynamic Standing:  Modified Independent       Pt is A & O x 4  Sensation:  Neuropathic pain L flank  Edema:  WNL      Therapeutic Exercises:  functional mobility    Patient education  Pt educated on role of PT    Patient response to education:   Pt verbalized understanding Pt demonstrated skill Pt requires further education in this area   x x x     ASSESSMENT:    Conditions Requiring Skilled Therapeutic Intervention:    [x]Decreased strength     []Decreased ROM  [x]Decreased functional mobility  [x]Decreased balance   [x]Decreased endurance   []Decreased posture  []Decreased sensation  []Decreased coordination   []Decreased vision  []Decreased safety awareness   [x]Increased pain       Comments:  Pt agreeable to PT evaluation. Pt performing bed mobility, transfers, and ambulation without assist. Pt declined [employer] stating he feels stable with balance, only significant pain. Patient would benefit from continued skilled PT to maximize functional mobility independence. Treatment:  Patient practiced and was instructed in the following treatment:    Bed mobility- verbal cues to facilitate independence  Functional transfers-Verbal cues for proper positioning and sequencing to perform transfers safely with maximum independence. Gait training- cues for improved stability    Pt's/ family goals   1. Get better    Prognosis is good for reaching above PT goals. Patient and or family understand(s) diagnosis, prognosis, and plan of care.   yes    PHYSICAL THERAPY PLAN OF CARE:    PT POC is established based on physician order and patient diagnosis     Referring provider/PT Order:    02/20/23 0915  PT eval and treat  Start:  02/20/23 0915,   End:  02/20/23 0915,   ONE TIME,   Standing Count:  1 Occurrences,   R         Carlos Newsome MD     Diagnosis:  Lactic acidosis [E87.20]  Intractable back pain [M54.9]  Leukocytosis, unspecified type [D72.829]  Acute left-sided low back pain without sciatica [M54.50]  Lumbar radiculopathy [M54.16]  Specific instructions for next treatment:  Functional mobility    Current Treatment Recommendations:     [x] Strengthening to improve independence with functional mobility   [x] ROM to improve independence with functional mobility   [x] Balance Training to improve static/dynamic balance and to reduce fall risk  [x] Endurance Training to improve activity tolerance during functional mobility   [x] Transfer Training to improve safety and independence with all functional transfers   [x] Gait Training to improve gait mechanics, endurance and assess need for appropriate assistive device  [x] Stair Training in preparation for safe discharge home and/or into the community   [x] Positioning to prevent skin breakdown and contractures  [x] Safety and Education Training   [x] Patient/Caregiver Education   [] HEP  [] Other     PT long term treatment goals are located in above grid    Frequency of treatments: 2-5x/week x 1-2 weeks. Time in  1030  Time out  1050    Total Treatment Time  8 minutes     Evaluation Time includes thorough review of current medical information, gathering information on past medical history/social history and prior level of function, completion of standardized testing/informal observation of tasks, assessment of data and education on plan of care and goals.     CPT codes:  [x] Low Complexity PT evaluation 24329  [] Moderate Complexity PT evaluation 72830  [] High Complexity PT evaluation 45309  [] PT Re-evaluation 88851  [] Gait training 43954 0 minutes  [] Manual therapy 69673 0 minutes  [x] Therapeutic activities 72393 8 minutes  [] Therapeutic exercises 84758 0 minutes  [] Neuromuscular reeducation 88820 0 minutes       Pattie Oden PT, DPT   [id number]

## 2023-02-22 NOTE — PROGRESS NOTES
Pharmacy Consultation Note  (Antibiotic Dosing and Monitoring)    Initial consult date: 2/21/23  Consulting physician/provider: Calista Jacobson MD  Drug: Vancomycin  Indication: Bloodstream Infection    Age/  Gender Height Weight IBW  Allergy Information   47 y.o./male 5' 8\" (172.7 cm) 149 lb 14.6 oz (68 kg)  68 kg   Ideal body weight: 68.4 kg (150 lb 12.1 oz)   Patient has no known allergies. Renal Function:  Recent Labs     02/19/23  1729 02/22/23  0516   BUN 22* 24*   CREATININE 0.9 1.0       No intake or output data in the 24 hours ending 02/22/23 0843    Vancomycin Monitoring:  Trough:  No results for input(s): VANCOTROUGH in the last 72 hours. Random:    Recent Labs     02/22/23  0516   VANCORANDOM 7.1       Recent Labs     02/20/23  0037   BLOODCULT2 Gram stain performed from blood culture bottle media  Gram positive cocci in clusters  *          Historical Cultures:  No results found for: [facility]  Recent Labs     02/20/23  0037   BC Gram stain performed from blood culture bottle media  Gram positive cocci in clusters  Previously positive blood culture called  *         Vancomycin Administration Times:  Recent vancomycin administrations                     vancomycin (VANCOCIN) 1,000 mg in sodium chloride 0.9 % 250 mL IVPB ([id number]) (mg) 1,000 mg New Bag 02/22/23 0645    vancomycin (VANCOCIN) 1,250 mg in sodium chloride 0.9 % 250 mL IVPB (mg) 1,250 mg New Bag 02/21/23 1554    vancomycin (VANCOCIN) 1,750 mg in sodium chloride 0.9 % 500 mL IVPB (mg) 1,750 mg New Bag 02/20/23 0110        Assessment:  Patient is a 49 y.o. male who has been initiated on vancomycin  Estimated Creatinine Clearance: 87 mL/min (based on SCr of 1 mg/dL). To dose vancomycin, pharmacy will be utilizing [employer] calculation software for goal AUC/JHONATHAN 400-600 mg/L-hr  Rm level 2/22 7.1- still accumulating.   Vancomycin 1000 mg q12h (Predicted AUC/JHONATHAN = 520, Tr = 15.7)    Plan:  Continue vancomycin 1000 mg IV q12h tomorrow  Will check vancomycin levels when appropriate   Will continue to monitor renal function   Pharmacy to follow      MONSE Keyes [facility] 2/22/2023 8:43 AM

## 2023-02-22 NOTE — PROGRESS NOTES
[address] [place] [address], [address] [place] [address] [address]        DWPZ:3/28/[birth year]68                                                  Patient Name: Payam Manuel    MRN: [id number]    : 1975    Room: [address] [place] [address]      Evaluating OT: Edwina Graham, [address] OTR/L; [id number]      Referring Provider: Karen Olivarez MD    Specific Provider Orders/Date: OT Eval and Treat 23      Diagnosis: Lumbar radiculopathy   Intractable back pain   Acute left-sided low back pain without sciatica    Surgery: none this admission     Sx History: 5/26/15 C3-T1 cervical fusion    Pertinent Medical History:  has a past medical history of Anxiety, HIGH CHOLESTEROL, and Osteoarthritis.       Reason for Admission: back pain and abdominal pain with Right Leg pain for multiple weeks following with pain management clinic prior to admission  *pending lumbar JOHANA - intractable lower back pain    Recommended Adaptive Equipment:  TBD pending progression/discharge plan - shower chair vs extended tub bench     Precautions:  Fall Risk, R LE pain      Assessment of current deficits:    [x] Functional mobility  [x]ADLs  [x] Strength               [x]Cognition    [x] Functional transfers   [x] IADLs         [x] Safety Awareness   [x]Endurance    [x] Fine Coordination              [x] Balance      [] Vision/perception   []Sensation     []Gross Motor Coordination  [] ROM  [] Delirium                   [] Motor Control     OT PLAN OF CARE   OT POC based on physician orders, patient diagnosis and results of clinical assessment    Frequency/Duration: 1-3 days/wk for 2 weeks PRN   Specific OT Treatment Interventions to include:   * Instruction/training on adapted ADL techniques and AE recommendations to increase functional independence within precautions       * Training on energy conservation strategies, correct breathing pattern and techniques to improve independence/tolerance for self-care routine  * Functional transfer/mobility training/DME recommendations for increased independence, safety, and fall prevention  * Patient/Family education to increase follow through with safety techniques and functional independence  * Recommendation of environmental modifications for increased safety with functional transfers/mobility and ADLs  * Therapeutic exercise to improve motor endurance, ROM, and functional strength for ADLs/functional transfers  * Therapeutic activities to facilitate/challenge dynamic balance, stand tolerance for increased safety and independence with ADLs      Home Living: Pt lives with wife in 2 story home with 3-4 steps and [facility] to enter. Bed and Bath on 2nd floor with option of 1st floor set up.   Bathroom setup: tub/shower on main floor  Equipment owned: none    Prior Level of Function: IND with ADLs    IND with IADLs  ambulated with no AD prior  Driving: yes - reports very little (to pain management appointments only)  Occupation: none stated    Pain Level: 0/10  Cognition: A&O: 4/4  Follows multi step directions   Memory:  good   Sequencing:  fair+   Problem solving:  fair+   Judgement/safety:  fair+     Functional Assessment:  AM-PAC Daily Activity Raw Score: 19/24   Initial Eval Status  Date: 2/22/23 Treatment Status  Date: STGs = LTGs  Time frame: 10-14 days   Feeding Independent       Grooming Stand by Assist   Independent    UB Dressing Stand by Assist   Independent    LB Dressing Stand by Assist   Independent    Bathing Stand by Assist  Educated on benefits of shower chair for increased endurance d/t decreased standing tolerance   Independent    Toileting Stand by Assist   Lower surface transfer no use of grab bars  Independent    Bed Mobility  Log Roll: SBA  Supine to sit: SBA   Sit to supine: SBA   Supine to sit: IND  Sit to supine: IND    Functional Transfers Sit to stand:SBA   Stand to sit:SBA  Stand pivot: SBA  Commode: SBA  Sit to stand: Mod Ind    Stand to sit:Mod Ind   Stand pivot: Mod Ind   Commode: Mod Ind     Functional Mobility SBA with no AD   within household distance  Mod Ind    Balance Sitting:     Static - IND     Dynamic - IND  Standing: SBA  Standing: Mod Ind   Activity Tolerance FAIR  GOOD   Visual/  Perceptual Glasses: none present        Vitals WFL           BUE  ROM/Strength/  Fine motor Coordination Hand dominance: Right     RUE: ROM WFL     Strength: grossly WFL      Strength: FAIR     Coordination:  FAIR     LUE: ROM WFL     Strength: grossly WFL      Strength: FAIR     Coordination:  FAIR  increase BUE muscle strength for improved indep with functional transfers       Hearing: WFL   Sensation:  No c/o numbness or tingling   Tone: WFL   Edema: unremarkable    Patient/Family Goal: pt goal is to return home   Based on patient's functional performance as stated below and level of assistance needed prior to admission, this therapist believes that the patient would benefit from further skilled OT following hospital stay in an effort to increase safety, functional independence, and quality of life. Comment: Cleared by RN to see pt. Upon arrival patient lying supine in bed and agreeable to OT session. At end of session, patient lying supine in bed with call light and phone within reach, all lines and tubes intact. Overall patient demonstrated  decreased independence and safety during completion of ADL/functional transfer/mobility tasks. Pt would benefit from continued skilled OT to increase safety and independence with completion of ADL/IADL tasks for functional independence and quality of life. Treatment:  Pt required vc's for proper technique/safety with hand placement/body mechanics/posture for bed mobility/ADLs/functional tranfers/mobility/ww management. Pt required vc's for sequencing/initiation of ADLs/functional transfers.  Pt able to  sit EOB ~10 mins and at sink ~2 mins to increase core strength/balance/activity tolerance for ease with ADLs. Pt required increased time to complete ADLs/functional transfers due to overall fatigue/weakness/pain. Pt completed functional mobility with room and bathroom, completed lower surface transfer, reported increased R leg/hip pain with prolonged functional mobility. Requested to return to supine with RLE elevated to relieve pain. Pt required skilled monitoring of SpO2 during session and education on energy conservation techniques. Pt required rest breaks during session and educated on pursed lip breathing. Pt appeared to have tolerated session well and appears motivated/cooperative/pleasant. Pt instructed on use of call light for assistance and fall prevention. Pt demo'ing fair understanding of education provided. Continue to educate. Rehab Potential: Good  for established goals       LTG: maximize independence with ADLs to return to PLOF    Patient and/or family were instructed on functional diagnosis, prognosis/goals and OT plan of care. Demonstrated FAIR understanding. [] Malnutrition indicators have been identified and nursing has been notified to ensure a dietitian consult is ordered. Eval Complexity: Low     Evaluation time includes thorough review of current medical information, gathering information on past medical & social history & PLOF, completion of standardized testing, informal observation of tasks, consultation with other medical professions/disciplines, assessment of data & development of POC/goals.      Time In: 2536  Time Out: 1050  Total Treatment Time: 8    Min Units   OT Eval Low 42809  x     OT Eval Medium 05353      OT Eval High 29328      OT Re-Eval [id number]       Therapeutic Ex 00474       Therapeutic Activities 50812       ADL/Self Care 65523  8  1   Orthotic Management 76895       Manual 24851     Neuro Re-Ed 14282       Non-Billable Time          Evaluation Time additionally includes thorough review of current medical information, gathering information on past medical history/social history and prior level of function, interpretation of standardized testing/informal observation of tasks, assessment of data and development of plan of care and goals.          SONY Wei OTR/L; [id number]

## 2023-02-22 NOTE — PROGRESS NOTES
Yazan Schilling MD [facility]  Internal medicine  Progress Notes      CHIEF COMPLAINT: Left lumbar pain intractable      HISTORY OF PRESENT ILLNESS:    2/20/2023  Patient was seen on the floor earlier this morning at the main campus of [address] reviewed in detail with the patient  Spoke with the ER physician at the time of admission  54-year-old  man currently disabled from several C-spine surgeries for degenerative disc disease  Presented with 2-week history of left lumbar pain radiating into the buttock  No history of trauma  Failed on conservative management with nonsteroidals etc. as an outpatient  Admitted for further management  No incontinence issues  Oral intake adequate  No sensory deficits  2/21/2023  Patient was seen on the floor earlier this morning  Essentially unchanged  MRI findings discussed with the patient which is essentially negative  Positive blood cultures reported  2/22/2023  Patient was seen on the floor earlier this morning  Registered nurse at bedside  Back pain remains the same  Bacteremia reported with staph epi  Past Medical History:    Past Medical History:   Diagnosis Date    Anxiety     HIGH CHOLESTEROL     Osteoarthritis        Past Surgical History:    Past Surgical History:   Procedure Laterality Date    CERVICAL FUSION  05/26/2015    C 3--T 1    NECK SURGERY      SPINAL FUSION      c 4-5-6       Medications Prior to Admission:    Medications Prior to Admission: HYDROcodone-acetaminophen (NORCO) 7.5-325 MG per tablet, Take 1 tablet by mouth 3 times daily as needed for Pain.  lidocaine (LIDODERM) 5 %, Place 1 patch onto the skin every 24 hours Place 1 patch onto the skin daily 12 hours on, 12 hours off.   ibuprofen (ADVIL;MOTRIN) 600 MG tablet, Take 1 tablet by mouth 3 times daily as needed for Pain  methocarbamol (ROBAXIN) 500 MG tablet, Take 1 tablet by mouth in the morning and at bedtime for 10 days Do not drive on this medication it causes severe impairment most people just take this at night  dexamethasone (DECADRON) 4 MG tablet, Take 1 tablet by mouth 2 times daily (with meals) for 10 days  rosuvastatin (CRESTOR) 40 MG tablet, take 1 tablet by mouth once daily  QUEtiapine (SEROQUEL) 50 MG tablet, Take 1 tablet by mouth 3 times daily  tiZANidine (ZANAFLEX) 4 MG tablet, Take 1 tablet by mouth every 8 hours as needed (neck spasms)  gabapentin (NEURONTIN) 300 MG capsule, Take 1 capsule by mouth 3 times daily for 30 days. medical marijuana, Take by mouth as needed. [DISCONTINUED] DULoxetine (CYMBALTA) 30 MG extended release capsule, Take 1 capsule by mouth daily  omeprazole (PRILOSEC) 20 MG delayed release capsule, Take 1 capsule by mouth daily (Patient taking differently: Take 20 mg by mouth daily as needed )  [DISCONTINUED] oxyCODONE-acetaminophen (PERCOCET) 5-325 MG per tablet, Take 1 tablet by mouth as needed for Pain. albuterol-ipratropium (COMBIVENT RESPIMAT)  MCG/ACT AERS inhaler, Inhale 1 puff into the lungs every 6 hours    Allergies:    Patient has no known allergies. Social History:    reports that he has never smoked. He has never used smokeless tobacco. He reports current alcohol use. He reports current drug use. Drug: Marijuana [place]). Family History:   family history includes Stroke in his father. REVIEW OF SYSTEMS:  As above in the HPI, otherwise negative    PHYSICAL EXAM:    Vitals:  /72   Pulse 64   Temp 98.2 °F (36.8 °C) (Temporal)   Resp 18   Ht 5' 7.99\" (1.727 m)   Wt 149 lb 14.6 oz (68 kg)   SpO2 96%   PF (!) 2 L/min   BMI 22.80 kg/m²     General:  Awake, alert, oriented X 3. Well developed, well nourished, well groomed. No apparent distress. HEENT:  Normocephalic, atraumatic. Pupils equal, round, reactive to light. No scleral icterus. No conjunctival injection. Normal lips, teeth, and gums. No nasal discharge.   Neck:  Supple  Heart:  RRR, no murmurs, gallops, rubs  Lungs:  CTA bilaterally, bilat symmetrical expansion, no wheeze, rales, or rhonchi  Abdomen:   Bowel sounds present, soft, nontender, no masses, no organomegaly, no peritoneal signs  Extremities:  No clubbing, cyanosis, or edema  Skin:  Warm and dry, no open lesions or rash  Neuro:  Cranial nerves 2-12 intact,  Straight leg raising on the left was positive at 60 degrees  Generalized low extremity weakness noted with atrophy  Breast: deferred  Rectal: deferred  Genitalia:  deferred    LABS:        ASSESSMENT:    Bacteremia staphylococcal  Concern for neck hardware infection  Principal Problem:    Intractable back pain  Lumbar radiculopathy      PLAN:  ID consult/input appreciated  On vancomycin  Repeat cultures are pending  Pain control  Resume home medications  MRI of the lumbar spine/negative findings  May need neurosurgical intervention  Spoke with the neurosurgery 2/20/2023  Questions answered to patient's Jeanie Juan MD  11:28 AM  2/22/2023

## 2023-02-22 NOTE — CARE COORDINATION
Social Work/Case Management Transition of Care Planning (Angi Monet [facility] [phone]): Per report, patient remains on IV Vanc q12. Patient was to have lumbar JOHANA. However, blood cultures were positive and this cannot be done until negative blood cultures are obtained. Repeat blood cultures were ordered. Discharge plan is to home with no needs. Family will transport. CM/SW will follow.   PATRICIA Hall  2/22/2023

## 2023-02-22 NOTE — PLAN OF CARE
Problem: Pain  Goal: Verbalizes/displays adequate comfort level or baseline comfort level  Outcome: Progressing  Flowsheets (Taken 2/21/2023 2045 by Albert Anderson RN)  Verbalizes/displays adequate comfort level or baseline comfort level: Encourage patient to monitor pain and request assistance     Problem: Safety - Adult  Goal: Free from fall injury  Outcome: Progressing     Problem: ABCDS Injury Assessment  Goal: Absence of physical injury  Outcome: Progressing

## 2023-02-22 NOTE — PLAN OF CARE
Problem: Pain  Goal: Verbalizes/displays adequate comfort level or baseline comfort level  2/22/2023 1044 by Loretta Severance, RN  Outcome: Progressing     Problem: Safety - Adult  Goal: Free from fall injury  2/22/2023 1044 by Loretta Severance, RN  Outcome: Progressing     Problem: ABCDS Injury Assessment  Goal: Absence of physical injury  2/22/2023 1044 by Loretta Severance, RN  Outcome: Progressing

## 2023-02-22 NOTE — PROGRESS NOTES
[address] [place] [address] [facility]  Progress Note      Chief Complaint   Patient presents with    Back Pain     Seen Thursday, middle of back hurts, abdominal pain since yesterday , left upper and lower quad pain, pt unable to sit up, seen at pain management- prescribed norco        SUBJECTIVE:  50-year-old male with history of several cervical spine surgeries with cervical spine implant, HLD presented with back pain. MRI of the thoracic and lumbar spine showed multilevel degenerative changes without any neuroforaminal stenosis. Neurosurgery consulted and did not recommend any neurosurgical intervention but rather recommended epidural steroid injections. Blood cultures are positive for Staphylococcus lugdunensis and Staphylococcus epidermidis and Streptococcus species. ID consulted for bacteremia. Patient is tolerating medications. No reported adverse drug reactions. No nausea, vomiting, diarrhea. Review of systems:  As stated above in the chief complaint, otherwise negative. Medications:  Scheduled Meds:   vancomycin  1,000 mg IntraVENous Q12H    gabapentin  300 mg Oral TID    methocarbamol  500 mg Oral BID    pantoprazole  40 mg Oral QAM AC    rosuvastatin  10 mg Oral Nightly    ipratropium-albuterol  1 ampule Inhalation 4x daily    QUEtiapine  100 mg Oral Nightly     Continuous Infusions:  PRN Meds:ondansetron, morphine, HYDROcodone-acetaminophen    OBJECTIVE:  /78   Pulse 70   Temp 98.9 °F (37.2 °C) (Oral)   Resp 16   Ht 5' 7.99\" (1.727 m)   Wt 149 lb 14.6 oz (68 kg)   SpO2 96%   PF (!) 2 L/min   BMI 22.80 kg/m²   Temp  Av °F (36.7 °C)  Min: 97.2 °F (36.2 °C)  Max: 98.9 °F (37.2 °C)  Constitutional: The patient is awake, alert, and oriented. Skin: Warm and dry. No rashes were noted. No jaundice. HEENT: Eyes show round, and reactive pupils. Moist mucous membranes, no ulcerations, no thrush. Neck: Supple to movements. No lymphadenopathy.    Chest: No use of accessory muscles to breathe. Symmetrical expansion. Auscultation reveals no wheezing, crackles, or rhonchi. Cardiovascular: S1 and S2 are rhythmic and regular. No murmurs appreciated. Abdomen: Positive bowel sounds to auscultation. Benign to palpation. No masses felt. No hepatosplenomegaly. Genitourinary: Deferred  Extremities: No clubbing, no cyanosis, no edema.   Musculoskeletal: No tenderness or pain in palpation of the cervical spine  Neurological: No focal neurodeficit, following commands  Lines: peripheral    Laboratory and Tests Review:  Lab Results   Component Value Date    WBC 14.1 (H) 02/19/2023    WBC 5.9 02/16/2023    WBC 7.9 07/30/2018    HGB 15.5 02/19/2023    HCT 45.0 02/19/2023    MCV 85.9 02/19/2023     02/19/2023     Lab Results   Component Value Date    NEUTROABS 9.84 (H) 02/19/2023    NEUTROABS 2.94 02/16/2023    NEUTROABS 5.43 05/24/2016     No results found for: [facility]  Lab Results   Component Value Date    ALT 31 02/19/2023    AST 31 02/19/2023    ALKPHOS 53 02/19/2023    BILITOT 0.7 02/19/2023     Lab Results   Component Value Date/Time     02/22/2023 05:16 AM    K 3.9 02/22/2023 05:16 AM     02/22/2023 05:16 AM    CO2 22 02/22/2023 05:16 AM    BUN 24 02/22/2023 05:16 AM    CREATININE 1.0 02/22/2023 05:16 AM    CREATININE 0.9 02/19/2023 05:29 PM    CREATININE 0.9 02/16/2023 10:23 AM    GFRAA >60 07/31/2018 05:15 AM    LABGLOM >60 02/22/2023 05:16 AM    GLUCOSE 98 02/22/2023 05:16 AM    PROT 7.5 02/19/2023 05:29 PM    LABALBU 3.7 02/22/2023 05:16 AM    CALCIUM 9.1 02/22/2023 05:16 AM    BILITOT 0.7 02/19/2023 05:29 PM    ALKPHOS 53 02/19/2023 05:29 PM    AST 31 02/19/2023 05:29 PM    ALT 31 02/19/2023 05:29 PM     No results found for: CRP  No results found for: [address]  Radiology:      Microbiology:   Lab Results   Component Value Date/Time    BC 24 Hours no growth 02/21/2023 02:32 PM    BC  02/20/2023 12:37 AM     Gram stain performed from blood culture bottle media  Gram positive cocci in clusters  Previously positive blood culture called  Evaluating for mixed staph species       Lab Results   Component Value Date/Time    BLOODCULT2 24 Hours no growth 02/21/2023 02:32 PM    Benjie Bryson  02/20/2023 12:37 AM     Gram stain performed from blood culture bottle media  Gram positive cocci in clusters  Evaluating for mixed staph species       No results found for: WNDABS  No results found for: RESPSMEAR  No results found for: MPNEUMO, CLAMYDCU, LABLEGI, AFBCX, FUNGSM, LABFUNG  No results found for: CULTRESP  No results found for: CXCATHTIP  No results found for: BFCS  No results found for: CXSURG  Urine Culture, Routine   Date Value Ref Range Status   05/22/2015 <10,000 CFU/mL  Gram positive cocci    Final     MRSA Culture Only   Date Value Ref Range Status   05/22/2015 Methicillin resistant Staph aureus not isolated  Final       Assessment:  Staph lugdunensis and epidermidis bacteremia  Patient has cervical implant  Coagulase-negative Staphylococcus and lugdunensis are known to infect joint/spine implants     Plan:    Repeat blood culture no growth so far. We will wait for at least 48 hours from the first negative blood culture. Continue vancomycin  If repeat blood cultures are positive we need to find out the source of his coagulase-negative Staphylococcus and lugdunensis.   We will continue to follow    Jenelle Lambert MD  6:03 PM  2/22/2023

## 2023-02-23 LAB
ALBUMIN SERPL-MCNC: 3.8 G/DL (ref 3.5–5.2)
ANION GAP SERPL CALCULATED.3IONS-SCNC: 9 MMOL/L (ref 7–16)
BLOOD CULTURE, ROUTINE: NORMAL
BUN BLDV-MCNC: 16 MG/DL (ref 6–20)
CALCIUM SERPL-MCNC: 9.1 MG/DL (ref 8.6–10.2)
CHLORIDE BLD-SCNC: 106 MMOL/L (ref 98–107)
CO2: 23 MMOL/L (ref 22–29)
CREAT SERPL-MCNC: 0.9 MG/DL (ref 0.7–1.2)
CULTURE, BLOOD 2: NORMAL
GFR SERPL CREATININE-BSD FRML MDRD: >60 ML/MIN/1.73
GLUCOSE BLD-MCNC: 94 MG/DL (ref 74–99)
INR BLD: 1
PHOSPHORUS: 4 MG/DL (ref 2.5–4.5)
POTASSIUM SERPL-SCNC: 3.8 MMOL/L (ref 3.5–5)
PROTHROMBIN TIME: 10.9 SEC (ref 9.3–12.4)
SODIUM BLD-SCNC: 138 MMOL/L (ref 132–146)

## 2023-02-23 PROCEDURE — 1200000000 HC SEMI PRIVATE

## 2023-02-23 PROCEDURE — 6370000000 HC RX 637 (ALT 250 FOR IP): Performed by: INTERNAL MEDICINE

## 2023-02-23 PROCEDURE — 85610 PROTHROMBIN TIME: CPT

## 2023-02-23 PROCEDURE — 2580000003 HC RX 258: Performed by: INTERNAL MEDICINE

## 2023-02-23 PROCEDURE — 80069 RENAL FUNCTION PANEL: CPT

## 2023-02-23 PROCEDURE — 6360000002 HC RX W HCPCS: Performed by: INTERNAL MEDICINE

## 2023-02-23 PROCEDURE — 36415 COLL VENOUS BLD VENIPUNCTURE: CPT

## 2023-02-23 RX ADMIN — GABAPENTIN 300 MG: 300 CAPSULE ORAL at 15:33

## 2023-02-23 RX ADMIN — QUETIAPINE FUMARATE 100 MG: 25 TABLET ORAL at 21:52

## 2023-02-23 RX ADMIN — GABAPENTIN 300 MG: 300 CAPSULE ORAL at 08:59

## 2023-02-23 RX ADMIN — MORPHINE SULFATE 5 MG: 4 INJECTION, SOLUTION INTRAMUSCULAR; INTRAVENOUS at 09:08

## 2023-02-23 RX ADMIN — MORPHINE SULFATE 5 MG: 4 INJECTION, SOLUTION INTRAMUSCULAR; INTRAVENOUS at 20:43

## 2023-02-23 RX ADMIN — VANCOMYCIN HYDROCHLORIDE 1000 MG: 1 INJECTION, POWDER, LYOPHILIZED, FOR SOLUTION INTRAVENOUS at 06:32

## 2023-02-23 RX ADMIN — PANTOPRAZOLE SODIUM 40 MG: 40 TABLET, DELAYED RELEASE ORAL at 06:29

## 2023-02-23 RX ADMIN — METHOCARBAMOL 500 MG: 500 TABLET ORAL at 08:59

## 2023-02-23 RX ADMIN — MORPHINE SULFATE 5 MG: 4 INJECTION, SOLUTION INTRAMUSCULAR; INTRAVENOUS at 14:01

## 2023-02-23 RX ADMIN — GABAPENTIN 300 MG: 300 CAPSULE ORAL at 21:52

## 2023-02-23 RX ADMIN — ROSUVASTATIN 10 MG: 10 TABLET, FILM COATED ORAL at 21:52

## 2023-02-23 RX ADMIN — HYDROCODONE BITARTRATE AND ACETAMINOPHEN 1 TABLET: 7.5; 325 TABLET ORAL at 17:25

## 2023-02-23 RX ADMIN — HYDROCODONE BITARTRATE AND ACETAMINOPHEN 1 TABLET: 7.5; 325 TABLET ORAL at 06:29

## 2023-02-23 RX ADMIN — METHOCARBAMOL 500 MG: 500 TABLET ORAL at 21:52

## 2023-02-23 ASSESSMENT — PAIN SCALES - GENERAL
PAINLEVEL_OUTOF10: 10
PAINLEVEL_OUTOF10: 9
PAINLEVEL_OUTOF10: 7
PAINLEVEL_OUTOF10: 10
PAINLEVEL_OUTOF10: 10

## 2023-02-23 ASSESSMENT — PAIN DESCRIPTION - ORIENTATION
ORIENTATION: LEFT
ORIENTATION: LOWER

## 2023-02-23 ASSESSMENT — PAIN DESCRIPTION - DESCRIPTORS
DESCRIPTORS: THROBBING;ACHING;DISCOMFORT
DESCRIPTORS: BURNING;SHOOTING

## 2023-02-23 ASSESSMENT — PAIN DESCRIPTION - LOCATION
LOCATION: BACK
LOCATION: BACK

## 2023-02-23 NOTE — PROGRESS NOTES
Neurosurgery notified that repeat blood cultures are negative and patient can get IR steroid injection if reordered.

## 2023-02-23 NOTE — PROGRESS NOTES
[address] [place] [address] [facility]  Progress Note      Chief Complaint   Patient presents with    Back Pain     Seen Thursday, middle of back hurts, abdominal pain since yesterday , left upper and lower quad pain, pt unable to sit up, seen at pain management- prescribed norco        SUBJECTIVE:  44-year-old male with history of several cervical spine surgeries with cervical spine implant, HLD presented with back pain. MRI of the thoracic and lumbar spine showed multilevel degenerative changes without any neuroforaminal stenosis. Neurosurgery consulted and did not recommend any neurosurgical intervention but rather recommended epidural steroid injections. Blood cultures are positive for Staphylococcus lugdunensis and Staphylococcus epidermidis and Streptococcus species. ID consulted for bacteremia. Patient is tolerating medications. No reported adverse drug reactions. No nausea, vomiting, diarrhea. Review of systems:  As stated above in the chief complaint, otherwise negative. Medications:  Scheduled Meds:   gabapentin  300 mg Oral TID    methocarbamol  500 mg Oral BID    pantoprazole  40 mg Oral QAM AC    rosuvastatin  10 mg Oral Nightly    ipratropium-albuterol  1 ampule Inhalation 4x daily    QUEtiapine  100 mg Oral Nightly     Continuous Infusions:  PRN Meds:ondansetron, morphine, HYDROcodone-acetaminophen    OBJECTIVE:  BP (!) 152/72   Pulse 73   Temp 99.1 °F (37.3 °C) (Temporal)   Resp 16   Ht 5' 7.99\" (1.727 m)   Wt 149 lb 14.6 oz (68 kg)   SpO2 98%   PF (!) 2 L/min   BMI 22.80 kg/m²   Temp  Av.9 °F (37.2 °C)  Min: 98.7 °F (37.1 °C)  Max: 99.1 °F (37.3 °C)  Constitutional: The patient is awake, alert, and oriented. Skin: Warm and dry. No rashes were noted. No jaundice. HEENT: Eyes show round, and reactive pupils. Moist mucous membranes, no ulcerations, no thrush. Neck: Supple to movements. No lymphadenopathy. Chest: No use of accessory muscles to breathe. Symmetrical expansion. Auscultation reveals no wheezing, crackles, or rhonchi. Cardiovascular: S1 and S2 are rhythmic and regular. No murmurs appreciated. Abdomen: Positive bowel sounds to auscultation. Benign to palpation. No masses felt. No hepatosplenomegaly. Genitourinary: Deferred  Extremities: No clubbing, no cyanosis, no edema.   Musculoskeletal: No tenderness or pain in palpation of the cervical spine  Neurological: No focal neurodeficit, following commands  Lines: peripheral    Laboratory and Tests Review:  Lab Results   Component Value Date    WBC 14.1 (H) 02/19/2023    WBC 5.9 02/16/2023    WBC 7.9 07/30/2018    HGB 15.5 02/19/2023    HCT 45.0 02/19/2023    MCV 85.9 02/19/2023     02/19/2023     Lab Results   Component Value Date    NEUTROABS 9.84 (H) 02/19/2023    NEUTROABS 2.94 02/16/2023    NEUTROABS 5.43 05/24/2016     No results found for: [facility]  Lab Results   Component Value Date    ALT 31 02/19/2023    AST 31 02/19/2023    ALKPHOS 53 02/19/2023    BILITOT 0.7 02/19/2023     Lab Results   Component Value Date/Time     02/23/2023 05:22 AM    K 3.8 02/23/2023 05:22 AM     02/23/2023 05:22 AM    CO2 23 02/23/2023 05:22 AM    BUN 16 02/23/2023 05:22 AM    CREATININE 0.9 02/23/2023 05:22 AM    CREATININE 1.0 02/22/2023 05:16 AM    CREATININE 0.9 02/19/2023 05:29 PM    GFRAA >60 07/31/2018 05:15 AM    LABGLOM >60 02/23/2023 05:22 AM    GLUCOSE 94 02/23/2023 05:22 AM    PROT 7.5 02/19/2023 05:29 PM    LABALBU 3.8 02/23/2023 05:22 AM    CALCIUM 9.1 02/23/2023 05:22 AM    BILITOT 0.7 02/19/2023 05:29 PM    ALKPHOS 53 02/19/2023 05:29 PM    AST 31 02/19/2023 05:29 PM    ALT 31 02/19/2023 05:29 PM     No results found for: CRP  No results found for: [address]  Radiology:      Microbiology:   Lab Results   Component Value Date/Time    BC 24 Hours no growth 02/21/2023 02:32 PM    BC  02/20/2023 12:37 AM     Gram stain performed from blood culture bottle media  Gram positive cocci in clusters  Previously positive blood culture called  Evaluating for mixed staph species      BC Identification and sensitivity to follow 02/20/2023 12:37 AM    BC Identification and sensitivity to follow 02/20/2023 12:37 AM    ORG Staphylococcus species 02/20/2023 12:37 AM    ORG Staphylococcus species 02/20/2023 12:37 AM    ORG Staphylococcus epidermidis 02/20/2023 12:37 AM     Lab Results   Component Value Date/Time    BLOODCULT2 24 Hours no growth 02/21/2023 02:32 PM    [place] Rena  02/20/2023 12:37 AM     Gram stain performed from blood culture bottle media  Gram positive cocci in clusters  Evaluating for mixed staph species      BLOODCULT2 Identification and sensitivity to follow 02/20/2023 12:37 AM    ORG Staphylococcus species 02/20/2023 12:37 AM    ORG Staphylococcus species 02/20/2023 12:37 AM    ORG Staphylococcus epidermidis 02/20/2023 12:37 AM     No results found for: WNDABS  No results found for: RESPSMEAR  No results found for: MPNEUMO, CLAMYDCU, LABLEGI, AFBCX, FUNGSM, LABFUNG  No results found for: CULTRESP  No results found for: CXCATHTIP  No results found for: BFCS  No results found for: CXSURG  Urine Culture, Routine   Date Value Ref Range Status   05/22/2015 <10,000 CFU/mL  Gram positive cocci    Final     MRSA Culture Only   Date Value Ref Range Status   05/22/2015 Methicillin resistant Staph aureus not isolated  Final       Assessment:  Staph lugdunensis and epidermidis bacteremia likely contaminant  Patient has cervical implant       Plan:    Repeat blood culture no growth so far for last 48 hrs. DC vancomycin  Epidural spinal injections can be done after 72 hours of negative blood culture.   We will continue to follow    Severo Partida MD  2:55 PM  2/23/2023

## 2023-02-23 NOTE — PROGRESS NOTES
SIGN OFF CONSULT    Pharmacy no longer has an active consult to dose and follow vancomycin. Pharmacy signing off this patient. Please re-consult pharmacy if future dosing is needed.     Thank you for the consult,    Umang Pinto PharmD 2/23/2023 1:00 PM

## 2023-02-23 NOTE — PROCEDURES
Spoke with charge RN and confirmed JOHANA will not be completed today in IR. Rescheduled for 2/24/23.

## 2023-02-23 NOTE — PROGRESS NOTES
Myla Granger,   Internal medicine  Progress Notes      CHIEF COMPLAINT: Left lumbar pain intractable      HISTORY OF PRESENT ILLNESS:    2/20/2023  Patient was seen on the floor earlier this morning at the main campus of [address] reviewed in detail with the patient  Spoke with the ER physician at the time of admission  79-year-old  man currently disabled from several C-spine surgeries for degenerative disc disease  Presented with 2-week history of left lumbar pain radiating into the buttock  No history of trauma  Failed on conservative management with nonsteroidals etc. as an outpatient  Admitted for further management  No incontinence issues  Oral intake adequate  No sensory deficits  2/21/2023  Patient was seen on the floor earlier this morning  Essentially unchanged  MRI findings discussed with the patient which is essentially negative  Positive blood cultures reported  Pt is resting this am in nad, he denies any cp, sob, n/v.  Past Medical History:    Past Medical History:   Diagnosis Date    Anxiety     HIGH CHOLESTEROL     Osteoarthritis        Past Surgical History:    Past Surgical History:   Procedure Laterality Date    CERVICAL FUSION  05/26/2015    C 3--T 1    NECK SURGERY      SPINAL FUSION      c 4-5-6       Medications Prior to Admission:    Medications Prior to Admission: HYDROcodone-acetaminophen (NORCO) 7.5-325 MG per tablet, Take 1 tablet by mouth 3 times daily as needed for Pain.  lidocaine (LIDODERM) 5 %, Place 1 patch onto the skin every 24 hours Place 1 patch onto the skin daily 12 hours on, 12 hours off.   ibuprofen (ADVIL;MOTRIN) 600 MG tablet, Take 1 tablet by mouth 3 times daily as needed for Pain  methocarbamol (ROBAXIN) 500 MG tablet, Take 1 tablet by mouth in the morning and at bedtime for 10 days Do not drive on this medication it causes severe impairment most people just take this at night  dexamethasone (DECADRON) 4 MG tablet, Take 1 tablet by mouth 2 times daily (with meals) for 10 days  rosuvastatin (CRESTOR) 40 MG tablet, take 1 tablet by mouth once daily  QUEtiapine (SEROQUEL) 50 MG tablet, Take 1 tablet by mouth 3 times daily  tiZANidine (ZANAFLEX) 4 MG tablet, Take 1 tablet by mouth every 8 hours as needed (neck spasms)  gabapentin (NEURONTIN) 300 MG capsule, Take 1 capsule by mouth 3 times daily for 30 days. medical marijuana, Take by mouth as needed. [DISCONTINUED] DULoxetine (CYMBALTA) 30 MG extended release capsule, Take 1 capsule by mouth daily  omeprazole (PRILOSEC) 20 MG delayed release capsule, Take 1 capsule by mouth daily (Patient taking differently: Take 20 mg by mouth daily as needed )  [DISCONTINUED] oxyCODONE-acetaminophen (PERCOCET) 5-325 MG per tablet, Take 1 tablet by mouth as needed for Pain. albuterol-ipratropium (COMBIVENT RESPIMAT)  MCG/ACT AERS inhaler, Inhale 1 puff into the lungs every 6 hours      PHYSICAL EXAM:    Vitals:  /79   Pulse 63   Temp 98.7 °F (37.1 °C) (Temporal)   Resp 17   Ht 5' 7.99\" (1.727 m)   Wt 149 lb 14.6 oz (68 kg)   SpO2 97%   PF (!) 2 L/min   BMI 22.80 kg/m²     General:  Awake, alert, oriented X 3. Well developed, well nourished, well groomed. No apparent distress. HEENT:  Normocephalic, atraumatic. Pupils equal, round, reactive to light. No scleral icterus. No conjunctival injection. Normal lips, teeth, and gums. No nasal discharge. Neck:  Supple  Heart:  RRR, no murmurs, gallops, rubs  Lungs:  CTA bilaterally, bilat symmetrical expansion, no wheeze, rales, or rhonchi  Abdomen: Bowel sounds present, soft, nontender, no masses, no organomegaly, no peritoneal signs  Extremities:  No clubbing, cyanosis, or edema  Skin:  Warm and dry, no open lesions or rash  Neuro:  Cranial nerves 2-12 intact,  Straight leg raising on the left was positive at 60 degrees  Generalized low extremity weakness noted with atrophy    ADULT DIET;  Regular          ASSESSMENT:    Bacteremia staphylococcal  Concern for neck hardware infection  Principal Problem:    Intractable back pain  Lumbar radiculopathy      PLAN:  ID for abx management, cultures still pending  NS following  PT/OT  Monitor labs    Billee Gutierrez, DO  7:07 AM  2/23/2023

## 2023-02-23 NOTE — PROGRESS NOTES
Pharmacy Consultation Note  (Antibiotic Dosing and Monitoring)    Initial consult date: 2/21/23  Consulting physician/provider: Reina Young MD  Drug: Vancomycin  Indication: Bloodstream Infection    Age/  Gender Height Weight IBW  Allergy Information   47 y.o./male 5' 8\" (172.7 cm) 149 lb 14.6 oz (68 kg)  68 kg   Ideal body weight: 68.4 kg (150 lb 12.1 oz)   Patient has no known allergies. Renal Function:  Recent Labs     02/22/23  0516 02/23/23  0522   BUN 24* 16   CREATININE 1.0 0.9       No intake or output data in the 24 hours ending 02/23/23 0851    Vancomycin Monitoring:  Trough:  No results for input(s): VANCOTROUGH in the last 72 hours. Random:    Recent Labs     02/22/23  0516   VANCORANDOM 7.1         Recent Labs     02/21/23  1432   BLOODCULT2 24 Hours no growth          Historical Cultures:  No results found for: ORG  Recent Labs     02/21/23  1432   BC 24 Hours no growth         Vancomycin Administration Times:  Recent vancomycin administrations                     vancomycin (VANCOCIN) 1,000 mg in sodium chloride 0.9 % 250 mL IVPB ([id number]) (mg) 1,000 mg New Bag 02/23/23 0632     1,000 mg New Bag 02/22/23 1853     1,000 mg New Bag  0645    vancomycin (VANCOCIN) 1,250 mg in sodium chloride 0.9 % 250 mL IVPB (mg) 1,250 mg New Bag 02/21/23 1554        Assessment:  Patient is a 49 y.o. male who has been initiated on vancomycin  Estimated Creatinine Clearance: 97 mL/min (based on SCr of 0.9 mg/dL). To dose vancomycin, pharmacy will be utilizing Surg[employer] calculation software for goal AUC/JHONATHAN 400-600 mg/L-hr  Rm level 2/22 7.1- still accumulating.   Vancomycin 1000 mg q12h (Predicted AUC/JHONATHAN = 481, Tr = 14.1)    Plan:  Continue vancomycin 1000 mg IV q12h tomorrow  Will check vancomycin levels when appropriate   Will continue to monitor renal function   Pharmacy to follow      MONSE Hu [facility] 2/23/2023 8:51 AM

## 2023-02-23 NOTE — CARE COORDINATION
Social Work/Case Management Transition of Care Planning (Evelyn Gaitan [place] [phone]): Per ID, repeat blood culture has no growth to date for last 48 hrs. IV vanc has been discontinued. Plan is for epidural spinal injections to be done after 72 hours of negative blood culture. Discharge plan is to home with no needs. Family to transport. CM/SW will follow.   PATRICIA Hancock  2/23/2023

## 2023-02-23 NOTE — PRE-PROCEDURE INSTRUCTIONS
55 S/w inpt RN, Pt ate today, Dr. Ana Godfrey wants NPO for [facility]. Order already placed for NPO tonight at 0000. JOHANA attentively for tomorrow (fri 2/24) if pt kept NPO, no thinners given, and pt remains stable for procedure.     Abigail WHITE RN

## 2023-02-24 ENCOUNTER — APPOINTMENT (OUTPATIENT)
Dept: INTERVENTIONAL RADIOLOGY/VASCULAR | Age: 48
DRG: 552 | End: 2023-02-24
Payer: MEDICARE

## 2023-02-24 LAB
ALBUMIN SERPL-MCNC: 3.7 G/DL (ref 3.5–5.2)
ANION GAP SERPL CALCULATED.3IONS-SCNC: 12 MMOL/L (ref 7–16)
BLOOD CULTURE, ROUTINE: ABNORMAL
BUN BLDV-MCNC: 14 MG/DL (ref 6–20)
CALCIUM SERPL-MCNC: 8.9 MG/DL (ref 8.6–10.2)
CHLORIDE BLD-SCNC: 105 MMOL/L (ref 98–107)
CO2: 22 MMOL/L (ref 22–29)
CREAT SERPL-MCNC: 0.9 MG/DL (ref 0.7–1.2)
GFR SERPL CREATININE-BSD FRML MDRD: >60 ML/MIN/1.73
GLUCOSE BLD-MCNC: 107 MG/DL (ref 74–99)
ORGANISM: ABNORMAL
ORGANISM: ABNORMAL
PHOSPHORUS: 3.1 MG/DL (ref 2.5–4.5)
POTASSIUM SERPL-SCNC: 4 MMOL/L (ref 3.5–5)
SODIUM BLD-SCNC: 139 MMOL/L (ref 132–146)

## 2023-02-24 PROCEDURE — 6360000004 HC RX CONTRAST MEDICATION: Performed by: RADIOLOGY

## 2023-02-24 PROCEDURE — 6360000002 HC RX W HCPCS: Performed by: INTERNAL MEDICINE

## 2023-02-24 PROCEDURE — 62323 NJX INTERLAMINAR LMBR/SAC: CPT

## 2023-02-24 PROCEDURE — 62323 NJX INTERLAMINAR LMBR/SAC: CPT | Performed by: RADIOLOGY

## 2023-02-24 PROCEDURE — 1200000000 HC SEMI PRIVATE

## 2023-02-24 PROCEDURE — 64483 NJX AA&/STRD TFRM EPI L/S 1: CPT

## 2023-02-24 PROCEDURE — 2709999900 IR INJ INTERLAMINAR EPI/SUBARACHNOID LUMB/SAC

## 2023-02-24 PROCEDURE — 36415 COLL VENOUS BLD VENIPUNCTURE: CPT

## 2023-02-24 PROCEDURE — 80069 RENAL FUNCTION PANEL: CPT

## 2023-02-24 PROCEDURE — 64483 NJX AA&/STRD TFRM EPI L/S 1: CPT | Performed by: RADIOLOGY

## 2023-02-24 PROCEDURE — 3E0R33Z INTRODUCTION OF ANTI-INFLAMMATORY INTO SPINAL CANAL, PERCUTANEOUS APPROACH: ICD-10-PCS | Performed by: RADIOLOGY

## 2023-02-24 PROCEDURE — 6370000000 HC RX 637 (ALT 250 FOR IP): Performed by: INTERNAL MEDICINE

## 2023-02-24 PROCEDURE — 6360000002 HC RX W HCPCS: Performed by: RADIOLOGY

## 2023-02-24 RX ORDER — ONDANSETRON 2 MG/ML
INJECTION INTRAMUSCULAR; INTRAVENOUS
Status: COMPLETED | OUTPATIENT
Start: 2023-02-24 | End: 2023-02-24

## 2023-02-24 RX ORDER — BETAMETHASONE SODIUM PHOSPHATE AND BETAMETHASONE ACETATE 3; 3 MG/ML; MG/ML
INJECTION, SUSPENSION INTRA-ARTICULAR; INTRALESIONAL; INTRAMUSCULAR; SOFT TISSUE
Status: COMPLETED | OUTPATIENT
Start: 2023-02-24 | End: 2023-02-24

## 2023-02-24 RX ADMIN — BETAMETHASONE ACETATE AND BETAMETHASONE SODIUM PHOSPHATE 6 MG: 3; 3 INJECTION, SUSPENSION INTRA-ARTICULAR; INTRALESIONAL; INTRAMUSCULAR; SOFT TISSUE at 09:19

## 2023-02-24 RX ADMIN — MORPHINE SULFATE 5 MG: 4 INJECTION, SOLUTION INTRAMUSCULAR; INTRAVENOUS at 04:23

## 2023-02-24 RX ADMIN — METHOCARBAMOL 500 MG: 500 TABLET ORAL at 10:03

## 2023-02-24 RX ADMIN — MORPHINE SULFATE 5 MG: 4 INJECTION, SOLUTION INTRAMUSCULAR; INTRAVENOUS at 10:03

## 2023-02-24 RX ADMIN — MORPHINE SULFATE 5 MG: 4 INJECTION, SOLUTION INTRAMUSCULAR; INTRAVENOUS at 14:42

## 2023-02-24 RX ADMIN — MORPHINE SULFATE 5 MG: 4 INJECTION, SOLUTION INTRAMUSCULAR; INTRAVENOUS at 20:33

## 2023-02-24 RX ADMIN — GABAPENTIN 300 MG: 300 CAPSULE ORAL at 10:03

## 2023-02-24 RX ADMIN — METHOCARBAMOL 500 MG: 500 TABLET ORAL at 20:33

## 2023-02-24 RX ADMIN — ONDANSETRON HYDROCHLORIDE 4 MG: 2 SOLUTION INTRAMUSCULAR; INTRAVENOUS at 09:22

## 2023-02-24 RX ADMIN — HYDROCODONE BITARTRATE AND ACETAMINOPHEN 1 TABLET: 7.5; 325 TABLET ORAL at 12:05

## 2023-02-24 RX ADMIN — IOPAMIDOL 1 ML: 612 INJECTION, SOLUTION INTRATHECAL at 09:37

## 2023-02-24 RX ADMIN — ROSUVASTATIN 10 MG: 10 TABLET, FILM COATED ORAL at 20:32

## 2023-02-24 RX ADMIN — GABAPENTIN 300 MG: 300 CAPSULE ORAL at 14:42

## 2023-02-24 RX ADMIN — PANTOPRAZOLE SODIUM 40 MG: 40 TABLET, DELAYED RELEASE ORAL at 06:21

## 2023-02-24 RX ADMIN — QUETIAPINE FUMARATE 100 MG: 25 TABLET ORAL at 20:33

## 2023-02-24 RX ADMIN — GABAPENTIN 300 MG: 300 CAPSULE ORAL at 20:32

## 2023-02-24 RX ADMIN — MORPHINE SULFATE 5 MG: 4 INJECTION, SOLUTION INTRAMUSCULAR; INTRAVENOUS at 23:45

## 2023-02-24 ASSESSMENT — PAIN SCALES - GENERAL
PAINLEVEL_OUTOF10: 10
PAINLEVEL_OUTOF10: 9

## 2023-02-24 ASSESSMENT — PAIN DESCRIPTION - LOCATION
LOCATION: BACK
LOCATION: BACK

## 2023-02-24 NOTE — BRIEF OP NOTE
Brief Postoperative Note    Gearline Child  YOB: 1975  [id number]    Pre-operative Diagnosis and Procedure: low back plan alannah    Post-operative Diagnosis: Same    Anesthesia: Local    Estimated Blood Loss: < 10 cc    Surgeon: Andrey RUDD     Complications: none    Specimen obtained: none     Findings: none     Katheryn Alcaraz MD   2/24/2023 10:06 AM

## 2023-02-24 NOTE — CARE COORDINATION
Social Work/Case Management Transition of Care Planning (Thalia [facility]endez [facility] [phone]): Per report, patient did go to IR today for JOHANA. Discharge plan is to return home with no needs. Family to transport. CM/SW will follow.    PATRICIA Velazquez  2/24/2023

## 2023-02-24 NOTE — PROGRESS NOTES
[address] [place] [address] [facility]  Progress Note      Chief Complaint   Patient presents with    Back Pain     Seen Thursday, middle of back hurts, abdominal pain since yesterday , left upper and lower quad pain, pt unable to sit up, seen at pain management- prescribed norco        SUBJECTIVE:  50-year-old male with history of several cervical spine surgeries with cervical spine implant, HLD presented with back pain. MRI of the thoracic and lumbar spine showed multilevel degenerative changes without any neuroforaminal stenosis. Neurosurgery consulted and did not recommend any neurosurgical intervention but rather recommended epidural steroid injections. Blood cultures are positive for Staphylococcus lugdunensis and Staphylococcus epidermidis and Streptococcus species. ID consulted for bacteremia. Patient is tolerating medications. No reported adverse drug reactions. No nausea, vomiting, diarrhea. Review of systems:  As stated above in the chief complaint, otherwise negative. Medications:  Scheduled Meds:   gabapentin  300 mg Oral TID    methocarbamol  500 mg Oral BID    pantoprazole  40 mg Oral QAM AC    rosuvastatin  10 mg Oral Nightly    ipratropium-albuterol  1 ampule Inhalation 4x daily    QUEtiapine  100 mg Oral Nightly     Continuous Infusions:  PRN Meds:ondansetron, morphine, HYDROcodone-acetaminophen    OBJECTIVE:  BP 98/70   Pulse 93   Temp 98.3 °F (36.8 °C) (Temporal)   Resp 18   Ht 5' 7.99\" (1.727 m)   Wt 149 lb 14.6 oz (68 kg)   SpO2 95%   PF (!) 2 L/min   BMI 22.80 kg/m²   Temp  Av.6 °F (37 °C)  Min: 98.3 °F (36.8 °C)  Max: 98.9 °F (37.2 °C)  Constitutional: The patient is awake, alert, and oriented. Skin: Warm and dry. No rashes were noted. No jaundice. HEENT: Eyes show round, and reactive pupils. Moist mucous membranes, no ulcerations, no thrush. Neck: Supple to movements. No lymphadenopathy. Chest: No use of accessory muscles to breathe.  Symmetrical expansion. Auscultation reveals no wheezing, crackles, or rhonchi. Cardiovascular: S1 and S2 are rhythmic and regular. No murmurs appreciated. Abdomen: Positive bowel sounds to auscultation. Benign to palpation. No masses felt. No hepatosplenomegaly. Genitourinary: Deferred  Extremities: No clubbing, no cyanosis, no edema.   Musculoskeletal: No tenderness or pain in palpation of the cervical spine  Neurological: No focal neurodeficit, following commands  Lines: peripheral    Laboratory and Tests Review:  Lab Results   Component Value Date    WBC 14.1 (H) 02/19/2023    WBC 5.9 02/16/2023    WBC 7.9 07/30/2018    HGB 15.5 02/19/2023    HCT 45.0 02/19/2023    MCV 85.9 02/19/2023     02/19/2023     Lab Results   Component Value Date    NEUTROABS 9.84 (H) 02/19/2023    NEUTROABS 2.94 02/16/2023    NEUTROABS 5.43 05/24/2016     No results found for: [facility]  Lab Results   Component Value Date    ALT 31 02/19/2023    AST 31 02/19/2023    ALKPHOS 53 02/19/2023    BILITOT 0.7 02/19/2023     Lab Results   Component Value Date/Time     02/24/2023 04:41 AM    K 4.0 02/24/2023 04:41 AM     02/24/2023 04:41 AM    CO2 22 02/24/2023 04:41 AM    BUN 14 02/24/2023 04:41 AM    CREATININE 0.9 02/24/2023 04:41 AM    CREATININE 0.9 02/23/2023 05:22 AM    CREATININE 1.0 02/22/2023 05:16 AM    GFRAA >60 07/31/2018 05:15 AM    LABGLOM >60 02/24/2023 04:41 AM    GLUCOSE 107 02/24/2023 04:41 AM    PROT 7.5 02/19/2023 05:29 PM    LABALBU 3.7 02/24/2023 04:41 AM    CALCIUM 8.9 02/24/2023 04:41 AM    BILITOT 0.7 02/19/2023 05:29 PM    ALKPHOS 53 02/19/2023 05:29 PM    AST 31 02/19/2023 05:29 PM    ALT 31 02/19/2023 05:29 PM     No results found for: CRP  No results found for: [address]  Radiology:      Microbiology:   Lab Results   Component Value Date/Time    BC 24 Hours no growth 02/22/2023 02:42 PM    BC 24 Hours no growth 02/21/2023 02:32 PM    BC Previously positive blood culture called 02/20/2023 12:37 AM    ORG Staphylococcus warneri 02/20/2023 12:37 AM    ORG Staphylococcus epidermidis 02/20/2023 12:37 AM    ORG Staphylococcus epidermidis 02/20/2023 12:37 AM     Lab Results   Component Value Date/Time    BLOODCULT2 24 Hours no growth 02/22/2023 02:42 PM    BLOODCULT2 24 Hours no growth 02/21/2023 02:32 PM    BLOODCULT2  02/20/2023 12:37 AM     Gram stain performed from blood culture bottle media  Gram positive cocci in clusters  Evaluating for mixed staph species      BLOODCULT2 Identification and sensitivity to follow 02/20/2023 12:37 AM    ORG Staphylococcus warneri 02/20/2023 12:37 AM    ORG Staphylococcus epidermidis 02/20/2023 12:37 AM    ORG Staphylococcus epidermidis 02/20/2023 12:37 AM     No results found for: WNDABS  No results found for: RESPSMEAR  No results found for: MPNEUMO, CLAMYDCU, LABLEGI, AFBCX, FUNGSM, LABFUNG  No results found for: CULTRESP  No results found for: CXCATHTIP  No results found for: BFCS  No results found for: CXSURG  Urine Culture, Routine   Date Value Ref Range Status   05/22/2015 <10,000 CFU/mL  Gram positive cocci    Final     MRSA Culture Only   Date Value Ref Range Status   05/22/2015 Methicillin resistant Staph aureus not isolated  Final       Assessment:  Staph warneri and epidermidis bacteremia likely contaminant  Patient has cervical implant       Plan:    Repeat blood culture no growth so far for last 72 hrs. DC vancomycin  Epidural spinal injections can be done after 72 hours of negative blood culture.   ID Services will Sign off     Claude Goldberg MD  4:09 PM  2/24/2023

## 2023-02-24 NOTE — PROCEDURES
Patient arrived to radiology department for lumbar epidural steroid injection. Allergies, medications, H&P and fasting instructions reviewed with patient. Vital signs taken. IV flushed and prn adapter attached. Procedural instructions given, questions answered, understanding expressed and consent signed.  Patient given fluoroscopy education, no questions at this time

## 2023-02-24 NOTE — PLAN OF CARE
Problem: Pain  Goal: Verbalizes/displays adequate comfort level or baseline comfort level  Outcome: Progressing     Problem: Safety - Adult  Goal: Free from fall injury  Outcome: Progressing  Flowsheets (Taken 2/24/2023 0026)  Free From Fall Injury: Instruct family/caregiver on patient safety     Problem: ABCDS Injury Assessment  Goal: Absence of physical injury  Outcome: Progressing  Flowsheets (Taken 2/24/2023 0026)  Absence of Physical Injury: Implement safety measures based on patient assessment

## 2023-02-24 NOTE — PROGRESS NOTES
Ralf Paris, DO  Internal medicine  Progress Notes      CHIEF COMPLAINT: Left lumbar pain intractable      HISTORY OF PRESENT ILLNESS:    2/20/2023  Patient was seen on the floor earlier this morning at the main campus of [address] reviewed in detail with the patient  Spoke with the ER physician at the time of admission  79-year-old  man currently disabled from several C-spine surgeries for degenerative disc disease  Presented with 2-week history of left lumbar pain radiating into the buttock  No history of trauma  Failed on conservative management with nonsteroidals etc. as an outpatient  Admitted for further management  No incontinence issues  Oral intake adequate  No sensory deficits  2/21/2023  Patient was seen on the floor earlier this morning  Essentially unchanged  MRI findings discussed with the patient which is essentially negative  Positive blood cultures reported  Pt is resting this am in nad, he denies any cp, sob, n/v.  Past Medical History:    Past Medical History:   Diagnosis Date    Anxiety     HIGH CHOLESTEROL     Osteoarthritis        Past Surgical History:    Past Surgical History:   Procedure Laterality Date    CERVICAL FUSION  05/26/2015    C 3--T 1    NECK SURGERY      SPINAL FUSION      c 4-5-6       Medications Prior to Admission:    Medications Prior to Admission: HYDROcodone-acetaminophen (NORCO) 7.5-325 MG per tablet, Take 1 tablet by mouth 3 times daily as needed for Pain.  lidocaine (LIDODERM) 5 %, Place 1 patch onto the skin every 24 hours Place 1 patch onto the skin daily 12 hours on, 12 hours off.   ibuprofen (ADVIL;MOTRIN) 600 MG tablet, Take 1 tablet by mouth 3 times daily as needed for Pain  methocarbamol (ROBAXIN) 500 MG tablet, Take 1 tablet by mouth in the morning and at bedtime for 10 days Do not drive on this medication it causes severe impairment most people just take this at night  dexamethasone (DECADRON) 4 MG tablet, Take 1 tablet by mouth 2 times daily (with meals) for 10 days  rosuvastatin (CRESTOR) 40 MG tablet, take 1 tablet by mouth once daily  QUEtiapine (SEROQUEL) 50 MG tablet, Take 1 tablet by mouth 3 times daily  tiZANidine (ZANAFLEX) 4 MG tablet, Take 1 tablet by mouth every 8 hours as needed (neck spasms)  gabapentin (NEURONTIN) 300 MG capsule, Take 1 capsule by mouth 3 times daily for 30 days. medical marijuana, Take by mouth as needed. [DISCONTINUED] DULoxetine (CYMBALTA) 30 MG extended release capsule, Take 1 capsule by mouth daily  omeprazole (PRILOSEC) 20 MG delayed release capsule, Take 1 capsule by mouth daily (Patient taking differently: Take 20 mg by mouth daily as needed )  [DISCONTINUED] oxyCODONE-acetaminophen (PERCOCET) 5-325 MG per tablet, Take 1 tablet by mouth as needed for Pain. albuterol-ipratropium (COMBIVENT RESPIMAT)  MCG/ACT AERS inhaler, Inhale 1 puff into the lungs every 6 hours      PHYSICAL EXAM:    Vitals:  /86   Pulse 72   Temp 98.8 °F (37.1 °C) (Temporal)   Resp 16   Ht 5' 7.99\" (1.727 m)   Wt 149 lb 14.6 oz (68 kg)   SpO2 96%   PF (!) 2 L/min   BMI 22.80 kg/m²     General:  Awake, alert, oriented X 3. Well developed, well nourished, well groomed. No apparent distress. HEENT:  Normocephalic, atraumatic. Pupils equal, round, reactive to light. No scleral icterus. No conjunctival injection. Normal lips, teeth, and gums. No nasal discharge. Neck:  Supple  Heart:  RRR, no murmurs, gallops, rubs  Lungs:  CTA bilaterally, bilat symmetrical expansion, no wheeze, rales, or rhonchi  Abdomen:   Bowel sounds present, soft, nontender, no masses, no organomegaly, no peritoneal signs  Extremities:  No clubbing, cyanosis, or edema  Skin:  Warm and dry, no open lesions or rash  Neuro:  Cranial nerves 2-12 intact,  Straight leg raising on the left was positive at 60 degrees  Generalized low extremity weakness noted with atrophy    Diet NPO          ASSESSMENT:    Bacteremia staphylococcal  Concern for neck hardware infection  Principal Problem:    Intractable back pain  Lumbar radiculopathy      PLAN:  ID for abx management, cultures still pending- now off  NS following- possible epidurals today   PT/OT  Monitor labs    Meño Em DO  7:57 AM  2/24/2023

## 2023-02-24 NOTE — INTERVAL H&P NOTE
H&P Update    Patient's History and Physical  was reviewed. The patient appears likely to able to tolerate the procedure. Risk and benefits discussed including ultimate complications, possibly death and consent obtained. Patient and/family had the opportunity to ask questions. All questions were answered and patient/family wishes to proceed.      Keith Romero, II

## 2023-02-25 VITALS
OXYGEN SATURATION: 95 % | HEIGHT: 68 IN | SYSTOLIC BLOOD PRESSURE: 106 MMHG | DIASTOLIC BLOOD PRESSURE: 74 MMHG | BODY MASS INDEX: 22.72 KG/M2 | TEMPERATURE: 99.4 F | WEIGHT: 149.91 LBS | HEART RATE: 69 BPM | RESPIRATION RATE: 16 BRPM

## 2023-02-25 PROBLEM — M54.50 ACUTE LEFT-SIDED LOW BACK PAIN WITHOUT SCIATICA: Status: RESOLVED | Noted: 2023-02-20 | Resolved: 2023-02-25

## 2023-02-25 LAB
ALBUMIN SERPL-MCNC: 4.1 G/DL (ref 3.5–5.2)
ANION GAP SERPL CALCULATED.3IONS-SCNC: 9 MMOL/L (ref 7–16)
BUN BLDV-MCNC: 14 MG/DL (ref 6–20)
CALCIUM SERPL-MCNC: 9.5 MG/DL (ref 8.6–10.2)
CHLORIDE BLD-SCNC: 105 MMOL/L (ref 98–107)
CO2: 23 MMOL/L (ref 22–29)
CREAT SERPL-MCNC: 0.8 MG/DL (ref 0.7–1.2)
GFR SERPL CREATININE-BSD FRML MDRD: >60 ML/MIN/1.73
GLUCOSE BLD-MCNC: 137 MG/DL (ref 74–99)
ORGANISM: ABNORMAL
ORGANISM: ABNORMAL
PHOSPHORUS: 2 MG/DL (ref 2.5–4.5)
POTASSIUM SERPL-SCNC: 4.4 MMOL/L (ref 3.5–5)
SODIUM BLD-SCNC: 137 MMOL/L (ref 132–146)

## 2023-02-25 PROCEDURE — 6370000000 HC RX 637 (ALT 250 FOR IP): Performed by: INTERNAL MEDICINE

## 2023-02-25 PROCEDURE — 80069 RENAL FUNCTION PANEL: CPT

## 2023-02-25 PROCEDURE — 36415 COLL VENOUS BLD VENIPUNCTURE: CPT

## 2023-02-25 PROCEDURE — 6360000002 HC RX W HCPCS: Performed by: EMERGENCY MEDICINE

## 2023-02-25 RX ADMIN — PANTOPRAZOLE SODIUM 40 MG: 40 TABLET, DELAYED RELEASE ORAL at 05:57

## 2023-02-25 RX ADMIN — METHOCARBAMOL 500 MG: 500 TABLET ORAL at 08:04

## 2023-02-25 RX ADMIN — HYDROCODONE BITARTRATE AND ACETAMINOPHEN 1 TABLET: 7.5; 325 TABLET ORAL at 11:20

## 2023-02-25 RX ADMIN — ONDANSETRON 4 MG: 2 INJECTION INTRAMUSCULAR; INTRAVENOUS at 04:59

## 2023-02-25 RX ADMIN — HYDROCODONE BITARTRATE AND ACETAMINOPHEN 1 TABLET: 7.5; 325 TABLET ORAL at 04:58

## 2023-02-25 RX ADMIN — GABAPENTIN 300 MG: 300 CAPSULE ORAL at 08:06

## 2023-02-25 ASSESSMENT — PAIN SCALES - GENERAL
PAINLEVEL_OUTOF10: 6
PAINLEVEL_OUTOF10: 7
PAINLEVEL_OUTOF10: 6

## 2023-02-25 ASSESSMENT — PAIN - FUNCTIONAL ASSESSMENT: PAIN_FUNCTIONAL_ASSESSMENT: ACTIVITIES ARE NOT PREVENTED

## 2023-02-25 ASSESSMENT — PAIN DESCRIPTION - LOCATION: LOCATION: BACK;NECK

## 2023-02-25 ASSESSMENT — PAIN DESCRIPTION - ORIENTATION: ORIENTATION: MID

## 2023-02-25 ASSESSMENT — PAIN DESCRIPTION - DESCRIPTORS: DESCRIPTORS: BURNING

## 2023-02-25 NOTE — CARE COORDINATION
IV Antibiotics discontinued yesterday. Plan is home today with no needs. For questions I can be reached at [phone].  Valorie Diaz, [facility]

## 2023-02-25 NOTE — PLAN OF CARE
Problem: Pain  Goal: Verbalizes/displays adequate comfort level or baseline comfort level  Outcome: Progressing  Flowsheets (Taken 2/24/2023 2000)  Verbalizes/displays adequate comfort level or baseline comfort level: Encourage patient to monitor pain and request assistance     Problem: Safety - Adult  Goal: Free from fall injury  Outcome: Progressing  Flowsheets (Taken 2/25/2023 0028)  Free From Fall Injury: Instruct family/caregiver on patient safety     Problem: ABCDS Injury Assessment  Goal: Absence of physical injury  Outcome: Progressing  Flowsheets (Taken 2/25/2023 0028)  Absence of Physical Injury: Implement safety measures based on patient assessment

## 2023-02-25 NOTE — PROGRESS NOTES
Anamaria Padilla, DO  Internal medicine  Progress Notes      CHIEF COMPLAINT: Left lumbar pain intractable      HISTORY OF PRESENT ILLNESS:    Pt resting, he is awake and alert, he states he is feeling much better this am, no cp or sob. Past Medical History:    Past Medical History:   Diagnosis Date    Anxiety     HIGH CHOLESTEROL     Osteoarthritis        Past Surgical History:    Past Surgical History:   Procedure Laterality Date    CERVICAL FUSION  05/26/2015    C 3--T 1    NECK SURGERY      SPINAL FUSION      c 4-5-6       Medications Prior to Admission:    Medications Prior to Admission: HYDROcodone-acetaminophen (NORCO) 7.5-325 MG per tablet, Take 1 tablet by mouth 3 times daily as needed for Pain.  lidocaine (LIDODERM) 5 %, Place 1 patch onto the skin every 24 hours Place 1 patch onto the skin daily 12 hours on, 12 hours off. ibuprofen (ADVIL;MOTRIN) 600 MG tablet, Take 1 tablet by mouth 3 times daily as needed for Pain  methocarbamol (ROBAXIN) 500 MG tablet, Take 1 tablet by mouth in the morning and at bedtime for 10 days Do not drive on this medication it causes severe impairment most people just take this at night  dexamethasone (DECADRON) 4 MG tablet, Take 1 tablet by mouth 2 times daily (with meals) for 10 days  rosuvastatin (CRESTOR) 40 MG tablet, take 1 tablet by mouth once daily  QUEtiapine (SEROQUEL) 50 MG tablet, Take 1 tablet by mouth 3 times daily  tiZANidine (ZANAFLEX) 4 MG tablet, Take 1 tablet by mouth every 8 hours as needed (neck spasms)  gabapentin (NEURONTIN) 300 MG capsule, Take 1 capsule by mouth 3 times daily for 30 days. medical marijuana, Take by mouth as needed.   [DISCONTINUED] DULoxetine (CYMBALTA) 30 MG extended release capsule, Take 1 capsule by mouth daily  omeprazole (PRILOSEC) 20 MG delayed release capsule, Take 1 capsule by mouth daily (Patient taking differently: Take 20 mg by mouth daily as needed )  [DISCONTINUED] oxyCODONE-acetaminophen (PERCOCET) 5-325 MG per tablet, Take 1 tablet by mouth as needed for Pain. albuterol-ipratropium (COMBIVENT RESPIMAT)  MCG/ACT AERS inhaler, Inhale 1 puff into the lungs every 6 hours      PHYSICAL EXAM:    Vitals:  /86   Pulse 95   Temp 98.2 °F (36.8 °C) (Temporal)   Resp 18   Ht 5' 7.99\" (1.727 m)   Wt 149 lb 14.6 oz (68 kg)   SpO2 93%   PF (!) 2 L/min   BMI 22.80 kg/m²     General:  Awake, alert, oriented X 3. Well developed, well nourished, well groomed. No apparent distress. HEENT:  Normocephalic, atraumatic. Pupils equal, round, reactive to light. No scleral icterus. No conjunctival injection. Normal lips, teeth, and gums. No nasal discharge. Neck:  Supple  Heart:  RRR, no murmurs, gallops, rubs  Lungs:  CTA bilaterally, bilat symmetrical expansion, no wheeze, rales, or rhonchi  Abdomen: Bowel sounds present, soft, nontender, no masses, no organomegaly, no peritoneal signs  Extremities:  No clubbing, cyanosis, or edema  Skin:  Warm and dry, no open lesions or rash  Neuro:  Cranial nerves 2-12 intact,  Straight leg raising on the left was positive at 60 degrees  Generalized low extremity weakness noted with atrophy    ADULT DIET;  Regular          ASSESSMENT:    Bacteremia staphylococcal  Concern for neck hardware infection  Principal Problem:    Intractable back pain  Lumbar radiculopathy      PLAN:  He is doing better, will discharge to home, he will follow up with pain clinic     Sourav Bolanos DO  6:23 AM  2/25/2023

## 2023-02-26 LAB
BLOOD CULTURE, ROUTINE: NORMAL
CULTURE, BLOOD 2: NORMAL

## 2023-02-27 LAB
BLOOD CULTURE, ROUTINE: NORMAL
CULTURE, BLOOD 2: NORMAL

## 2024-01-17 ENCOUNTER — HOSPITAL ENCOUNTER (EMERGENCY)
Age: 49
Discharge: HOME | End: 2024-01-17
Attending: EMERGENCY MEDICINE
Payer: MEDICARE

## 2024-01-17 ENCOUNTER — APPOINTMENT (OUTPATIENT)
Dept: CT IMAGING | Age: 49
End: 2024-01-17
Payer: MEDICARE

## 2024-01-17 VITALS
HEIGHT: 67 IN | SYSTOLIC BLOOD PRESSURE: 112 MMHG | OXYGEN SATURATION: 96 % | BODY MASS INDEX: 23.54 KG/M2 | TEMPERATURE: 98.4 F | DIASTOLIC BLOOD PRESSURE: 68 MMHG | RESPIRATION RATE: 18 BRPM | WEIGHT: 150 LBS | HEART RATE: 73 BPM

## 2024-01-17 DIAGNOSIS — R11.0 NAUSEA: ICD-10-CM

## 2024-01-17 DIAGNOSIS — R10.31 RIGHT LOWER QUADRANT ABDOMINAL PAIN: Primary | ICD-10-CM

## 2024-01-17 DIAGNOSIS — K59.00 CONSTIPATION, UNSPECIFIED CONSTIPATION TYPE: ICD-10-CM

## 2024-01-17 LAB
ALBUMIN SERPL-MCNC: 4.9 G/DL (ref 3.5–5.2)
ALP SERPL-CCNC: 57 U/L (ref 40–129)
ALT SERPL-CCNC: 39 U/L (ref 0–40)
ANION GAP SERPL CALCULATED.3IONS-SCNC: 11 MMOL/L (ref 7–16)
AST SERPL-CCNC: 31 U/L (ref 0–39)
BASOPHILS # BLD: 0.05 K/UL (ref 0–0.2)
BASOPHILS NFR BLD: 1 % (ref 0–2)
BILIRUB SERPL-MCNC: 0.6 MG/DL (ref 0–1.2)
BILIRUB UR QL STRIP: NEGATIVE
BUN SERPL-MCNC: 15 MG/DL (ref 6–20)
CALCIUM SERPL-MCNC: 10.6 MG/DL (ref 8.6–10.2)
CHLORIDE SERPL-SCNC: 104 MMOL/L (ref 98–107)
CLARITY UR: CLEAR
CO2 SERPL-SCNC: 26 MMOL/L (ref 22–29)
COLOR UR: YELLOW
CREAT SERPL-MCNC: 1 MG/DL (ref 0.7–1.2)
EOSINOPHIL # BLD: 0.02 K/UL (ref 0.05–0.5)
EOSINOPHILS RELATIVE PERCENT: 0 % (ref 0–6)
EPI CELLS #/AREA URNS HPF: NORMAL /HPF
ERYTHROCYTE [DISTWIDTH] IN BLOOD BY AUTOMATED COUNT: 12.1 % (ref 11.5–15)
GFR SERPL CREATININE-BSD FRML MDRD: >60 ML/MIN/1.73M2
GLUCOSE SERPL-MCNC: 115 MG/DL (ref 74–99)
GLUCOSE UR STRIP-MCNC: NEGATIVE MG/DL
HCT VFR BLD AUTO: 46.1 % (ref 37–54)
HGB BLD-MCNC: 16.2 G/DL (ref 12.5–16.5)
HGB UR QL STRIP.AUTO: NEGATIVE
IMM GRANULOCYTES # BLD AUTO: <0.03 K/UL (ref 0–0.58)
IMM GRANULOCYTES NFR BLD: 0 % (ref 0–5)
KETONES UR STRIP-MCNC: NEGATIVE MG/DL
LACTATE BLDV-SCNC: 1.3 MMOL/L (ref 0.5–2.2)
LEUKOCYTE ESTERASE UR QL STRIP: NEGATIVE
LIPASE SERPL-CCNC: 40 U/L (ref 13–60)
LYMPHOCYTES NFR BLD: 2.04 K/UL (ref 1.5–4)
LYMPHOCYTES RELATIVE PERCENT: 30 % (ref 20–42)
MCH RBC QN AUTO: 29.7 PG (ref 26–35)
MCHC RBC AUTO-ENTMCNC: 35.1 G/DL (ref 32–34.5)
MCV RBC AUTO: 84.4 FL (ref 80–99.9)
MONOCYTES NFR BLD: 0.66 K/UL (ref 0.1–0.95)
MONOCYTES NFR BLD: 10 % (ref 2–12)
NEUTROPHILS NFR BLD: 59 % (ref 43–80)
NEUTS SEG NFR BLD: 3.98 K/UL (ref 1.8–7.3)
NITRITE UR QL STRIP: NEGATIVE
PH UR STRIP: 6 [PH] (ref 5–9)
PLATELET # BLD AUTO: 255 K/UL (ref 130–450)
PMV BLD AUTO: 10.1 FL (ref 7–12)
POTASSIUM SERPL-SCNC: 4.1 MMOL/L (ref 3.5–5)
PROT SERPL-MCNC: 7.8 G/DL (ref 6.4–8.3)
PROT UR STRIP-MCNC: NEGATIVE MG/DL
RBC # BLD AUTO: 5.46 M/UL (ref 3.8–5.8)
RBC #/AREA URNS HPF: NORMAL /HPF
SODIUM SERPL-SCNC: 141 MMOL/L (ref 132–146)
SP GR UR STRIP: 1.01 (ref 1–1.03)
UROBILINOGEN UR STRIP-ACNC: 0.2 EU/DL (ref 0–1)
WBC #/AREA URNS HPF: NORMAL /HPF
WBC OTHER # BLD: 6.8 K/UL (ref 4.5–11.5)

## 2024-01-17 PROCEDURE — 6360000004 HC RX CONTRAST MEDICATION: Performed by: RADIOLOGY

## 2024-01-17 PROCEDURE — 96374 THER/PROPH/DIAG INJ IV PUSH: CPT

## 2024-01-17 PROCEDURE — 81001 URINALYSIS AUTO W/SCOPE: CPT

## 2024-01-17 PROCEDURE — A4216 STERILE WATER/SALINE, 10 ML: HCPCS

## 2024-01-17 PROCEDURE — 6360000002 HC RX W HCPCS: Performed by: EMERGENCY MEDICINE

## 2024-01-17 PROCEDURE — 74177 CT ABD & PELVIS W/CONTRAST: CPT

## 2024-01-17 PROCEDURE — 99285 EMERGENCY DEPT VISIT HI MDM: CPT

## 2024-01-17 PROCEDURE — 2500000003 HC RX 250 WO HCPCS

## 2024-01-17 PROCEDURE — 6360000002 HC RX W HCPCS

## 2024-01-17 PROCEDURE — 80053 COMPREHEN METABOLIC PANEL: CPT

## 2024-01-17 PROCEDURE — 96376 TX/PRO/DX INJ SAME DRUG ADON: CPT

## 2024-01-17 PROCEDURE — 83605 ASSAY OF LACTIC ACID: CPT

## 2024-01-17 PROCEDURE — 2580000003 HC RX 258

## 2024-01-17 PROCEDURE — 96375 TX/PRO/DX INJ NEW DRUG ADDON: CPT

## 2024-01-17 PROCEDURE — 85025 COMPLETE CBC W/AUTO DIFF WBC: CPT

## 2024-01-17 PROCEDURE — 83690 ASSAY OF LIPASE: CPT

## 2024-01-17 RX ORDER — ONDANSETRON 2 MG/ML
4 INJECTION INTRAMUSCULAR; INTRAVENOUS ONCE
Status: COMPLETED | OUTPATIENT
Start: 2024-01-17 | End: 2024-01-17

## 2024-01-17 RX ORDER — ONDANSETRON 4 MG/1
4 TABLET, ORALLY DISINTEGRATING ORAL 3 TIMES DAILY PRN
Qty: 21 TABLET | Refills: 0 | Status: SHIPPED | OUTPATIENT
Start: 2024-01-17

## 2024-01-17 RX ORDER — MORPHINE SULFATE 4 MG/ML
4 INJECTION, SOLUTION INTRAMUSCULAR; INTRAVENOUS ONCE
Status: COMPLETED | OUTPATIENT
Start: 2024-01-17 | End: 2024-01-17

## 2024-01-17 RX ORDER — KETOROLAC TROMETHAMINE 30 MG/ML
30 INJECTION, SOLUTION INTRAMUSCULAR; INTRAVENOUS ONCE
Status: COMPLETED | OUTPATIENT
Start: 2024-01-17 | End: 2024-01-17

## 2024-01-17 RX ORDER — POLYETHYLENE GLYCOL 3350 17 G/17G
17 POWDER, FOR SOLUTION ORAL DAILY
Qty: 225 G | Refills: 0 | Status: SHIPPED | OUTPATIENT
Start: 2024-01-17 | End: 2024-01-27

## 2024-01-17 RX ORDER — IBUPROFEN 600 MG/1
600 TABLET ORAL 4 TIMES DAILY PRN
Qty: 40 TABLET | Refills: 0 | Status: SHIPPED | OUTPATIENT
Start: 2024-01-17

## 2024-01-17 RX ORDER — DOCUSATE SODIUM 100 MG/1
100 CAPSULE, LIQUID FILLED ORAL 2 TIMES DAILY
Qty: 60 CAPSULE | Refills: 0 | Status: SHIPPED | OUTPATIENT
Start: 2024-01-17 | End: 2024-02-16

## 2024-01-17 RX ORDER — 0.9 % SODIUM CHLORIDE 0.9 %
1000 INTRAVENOUS SOLUTION INTRAVENOUS ONCE
Status: COMPLETED | OUTPATIENT
Start: 2024-01-17 | End: 2024-01-17

## 2024-01-17 RX ADMIN — ONDANSETRON 4 MG: 2 INJECTION INTRAMUSCULAR; INTRAVENOUS at 18:43

## 2024-01-17 RX ADMIN — ONDANSETRON 4 MG: 2 INJECTION INTRAMUSCULAR; INTRAVENOUS at 21:16

## 2024-01-17 RX ADMIN — MORPHINE SULFATE 4 MG: 4 INJECTION, SOLUTION INTRAMUSCULAR; INTRAVENOUS at 18:42

## 2024-01-17 RX ADMIN — SODIUM CHLORIDE 1000 ML: 9 INJECTION, SOLUTION INTRAVENOUS at 18:36

## 2024-01-17 RX ADMIN — MORPHINE SULFATE 4 MG: 4 INJECTION, SOLUTION INTRAMUSCULAR; INTRAVENOUS at 21:37

## 2024-01-17 RX ADMIN — FAMOTIDINE 20 MG: 10 INJECTION, SOLUTION INTRAVENOUS at 21:16

## 2024-01-17 RX ADMIN — IOPAMIDOL 75 ML: 755 INJECTION, SOLUTION INTRAVENOUS at 19:45

## 2024-01-17 RX ADMIN — KETOROLAC TROMETHAMINE 30 MG: 30 INJECTION, SOLUTION INTRAMUSCULAR; INTRAVENOUS at 18:43

## 2024-01-17 ASSESSMENT — PAIN DESCRIPTION - ORIENTATION
ORIENTATION: RIGHT
ORIENTATION: RIGHT
ORIENTATION: RIGHT;LOWER

## 2024-01-17 ASSESSMENT — PAIN SCALES - GENERAL
PAINLEVEL_OUTOF10: 10
PAINLEVEL_OUTOF10: 10
PAINLEVEL_OUTOF10: 9

## 2024-01-17 ASSESSMENT — PAIN DESCRIPTION - LOCATION
LOCATION: ABDOMEN

## 2024-01-17 ASSESSMENT — PAIN DESCRIPTION - ONSET: ONSET: ON-GOING

## 2024-01-17 ASSESSMENT — PAIN DESCRIPTION - DESCRIPTORS
DESCRIPTORS: ACHING;SHARP
DESCRIPTORS: STABBING;PRESSURE;DISCOMFORT
DESCRIPTORS: DISCOMFORT;ACHING;SORE

## 2024-01-17 ASSESSMENT — PAIN - FUNCTIONAL ASSESSMENT
PAIN_FUNCTIONAL_ASSESSMENT: 0-10
PAIN_FUNCTIONAL_ASSESSMENT: PREVENTS OR INTERFERES SOME ACTIVE ACTIVITIES AND ADLS

## 2024-01-17 ASSESSMENT — PAIN DESCRIPTION - PAIN TYPE: TYPE: ACUTE PAIN

## 2024-01-17 ASSESSMENT — PAIN DESCRIPTION - FREQUENCY: FREQUENCY: CONTINUOUS

## 2024-01-17 NOTE — ED PROVIDER NOTES
abdomen pelvis with IV contrast obtained.  Labs interpreted by me.  Imaging interpreted by radiologist and reviewed by me.  Peripheral IV was inserted was given normal saline, IV Toradol 30 mg for analgesia, IV morphine 4 mg for analgesia, and IV Zofran 4 mg antibiotic with good improvement of his symptoms.  He did have return of pain and was was remedicated with an additional 4 mg of IV morphine and IV Pepcid 20 mg with good analgesia.  Workup in the ED revealed no acute findings.  CBC revealed no leukocytosis, leukopenia, left shift, and there is no lactic acidosis to raise concern for infectious etiology.  No significant electrolyte abnormalities.  There is mild hypercalcemia with calcium slightly increased at 10.6.  No other electrolyte abnormalities.  Renal function is normal.  Glucose 115.  GI profile normal.  UA does not indicate infection.  CT of abdomen pelvis obtained with IV contrast without evidence of any infectious/acute etiology to explain his right lower quadrant pain.  Specifically, there is no evidence of appendicitis, diverticulitis, or obstructive uropathy.  Diffuse stool retention is noted.  On reassessment, he did continue to have right lower quadrant tenderness and a positive [place] sign.  Subsequently, Dr. Spear, ED attending was involved in patient's care and general surgery was consulted.  ED attending did speak with general surgery.  Please see his note for additional details.   Images were personally reviewed by general surgery and she does feel that pain is constipation related.  Plan will be for discharge home with MiraLAX and Colace for constipation management, ibuprofen for analgesia, Zofran and ODT as antiemetic, and general surgery follow-up.  He is to follow-up with his PMD.  Red flag symptoms/return precautions were discussed.  He is advised should he have worsening abdominal pain, onset of fever, persistent vomiting, changes in urination, melena/rectal bleeding, or inability

## 2024-05-14 ENCOUNTER — APPOINTMENT (OUTPATIENT)
Dept: GENERAL RADIOLOGY | Age: 49
End: 2024-05-14
Payer: MEDICARE

## 2024-05-14 ENCOUNTER — HOSPITAL ENCOUNTER (EMERGENCY)
Age: 49
Discharge: HOME | End: 2024-05-14
Payer: MEDICARE

## 2024-05-14 VITALS
SYSTOLIC BLOOD PRESSURE: 116 MMHG | BODY MASS INDEX: 22.73 KG/M2 | HEART RATE: 78 BPM | DIASTOLIC BLOOD PRESSURE: 77 MMHG | OXYGEN SATURATION: 99 % | RESPIRATION RATE: 16 BRPM | WEIGHT: 150 LBS | HEIGHT: 68 IN | TEMPERATURE: 98.4 F

## 2024-05-14 DIAGNOSIS — G89.29 ACUTE EXACERBATION OF CHRONIC LOW BACK PAIN: Primary | ICD-10-CM

## 2024-05-14 DIAGNOSIS — M54.50 ACUTE EXACERBATION OF CHRONIC LOW BACK PAIN: Primary | ICD-10-CM

## 2024-05-14 LAB
ANION GAP SERPL CALCULATED.3IONS-SCNC: 9 MMOL/L (ref 7–16)
BASOPHILS # BLD: 0.04 K/UL (ref 0–0.2)
BASOPHILS NFR BLD: 1 % (ref 0–2)
BILIRUB UR QL STRIP: NEGATIVE
BUN SERPL-MCNC: 9 MG/DL (ref 6–20)
CALCIUM SERPL-MCNC: 10.2 MG/DL (ref 8.6–10.2)
CHLORIDE SERPL-SCNC: 102 MMOL/L (ref 98–107)
CLARITY UR: CLEAR
CO2 SERPL-SCNC: 26 MMOL/L (ref 22–29)
COLOR UR: YELLOW
CREAT SERPL-MCNC: 0.9 MG/DL (ref 0.7–1.2)
EOSINOPHIL # BLD: 0.04 K/UL (ref 0.05–0.5)
EOSINOPHILS RELATIVE PERCENT: 1 % (ref 0–6)
ERYTHROCYTE [DISTWIDTH] IN BLOOD BY AUTOMATED COUNT: 11.9 % (ref 11.5–15)
GFR, ESTIMATED: >90 ML/MIN/1.73M2
GLUCOSE SERPL-MCNC: 97 MG/DL (ref 74–99)
GLUCOSE UR STRIP-MCNC: NEGATIVE MG/DL
HCT VFR BLD AUTO: 46 % (ref 37–54)
HGB BLD-MCNC: 15.7 G/DL (ref 12.5–16.5)
HGB UR QL STRIP.AUTO: NEGATIVE
IMM GRANULOCYTES # BLD AUTO: <0.03 K/UL (ref 0–0.58)
IMM GRANULOCYTES NFR BLD: 0 % (ref 0–5)
KETONES UR STRIP-MCNC: NEGATIVE MG/DL
LEUKOCYTE ESTERASE UR QL STRIP: NEGATIVE
LYMPHOCYTES NFR BLD: 2.3 K/UL (ref 1.5–4)
LYMPHOCYTES RELATIVE PERCENT: 38 % (ref 20–42)
MCH RBC QN AUTO: 29.6 PG (ref 26–35)
MCHC RBC AUTO-ENTMCNC: 34.1 G/DL (ref 32–34.5)
MCV RBC AUTO: 86.8 FL (ref 80–99.9)
MONOCYTES NFR BLD: 0.41 K/UL (ref 0.1–0.95)
MONOCYTES NFR BLD: 7 % (ref 2–12)
NEUTROPHILS NFR BLD: 54 % (ref 43–80)
NEUTS SEG NFR BLD: 3.3 K/UL (ref 1.8–7.3)
NITRITE UR QL STRIP: NEGATIVE
PH UR STRIP: 6 [PH] (ref 5–9)
PLATELET # BLD AUTO: 210 K/UL (ref 130–450)
PMV BLD AUTO: 9.8 FL (ref 7–12)
POTASSIUM SERPL-SCNC: 3.9 MMOL/L (ref 3.5–5)
PROT UR STRIP-MCNC: NEGATIVE MG/DL
RBC # BLD AUTO: 5.3 M/UL (ref 3.8–5.8)
RBC #/AREA URNS HPF: NORMAL /HPF
SODIUM SERPL-SCNC: 137 MMOL/L (ref 132–146)
SP GR UR STRIP: 1.01 (ref 1–1.03)
UROBILINOGEN UR STRIP-ACNC: 0.2 EU/DL (ref 0–1)
WBC #/AREA URNS HPF: NORMAL /HPF
WBC OTHER # BLD: 6.1 K/UL (ref 4.5–11.5)

## 2024-05-14 PROCEDURE — 99284 EMERGENCY DEPT VISIT MOD MDM: CPT

## 2024-05-14 PROCEDURE — 72100 X-RAY EXAM L-S SPINE 2/3 VWS: CPT

## 2024-05-14 PROCEDURE — 85025 COMPLETE CBC W/AUTO DIFF WBC: CPT

## 2024-05-14 PROCEDURE — 81001 URINALYSIS AUTO W/SCOPE: CPT

## 2024-05-14 PROCEDURE — 96372 THER/PROPH/DIAG INJ SC/IM: CPT

## 2024-05-14 PROCEDURE — 80048 BASIC METABOLIC PNL TOTAL CA: CPT

## 2024-05-14 PROCEDURE — 6360000002 HC RX W HCPCS: Performed by: PHYSICIAN ASSISTANT

## 2024-05-14 RX ORDER — KETOROLAC TROMETHAMINE 30 MG/ML
30 INJECTION, SOLUTION INTRAMUSCULAR; INTRAVENOUS ONCE
Status: COMPLETED | OUTPATIENT
Start: 2024-05-14 | End: 2024-05-14

## 2024-05-14 RX ADMIN — KETOROLAC TROMETHAMINE 30 MG: 30 INJECTION, SOLUTION INTRAMUSCULAR at 18:45

## 2024-05-14 ASSESSMENT — PAIN DESCRIPTION - FREQUENCY: FREQUENCY: CONTINUOUS

## 2024-05-14 ASSESSMENT — PAIN SCALES - GENERAL
PAINLEVEL_OUTOF10: 8
PAINLEVEL_OUTOF10: 9

## 2024-05-14 ASSESSMENT — PAIN DESCRIPTION - LOCATION
LOCATION: BACK
LOCATION: FLANK

## 2024-05-14 ASSESSMENT — PAIN - FUNCTIONAL ASSESSMENT: PAIN_FUNCTIONAL_ASSESSMENT: 0-10

## 2024-05-14 ASSESSMENT — PAIN DESCRIPTION - ONSET: ONSET: ON-GOING

## 2024-05-14 ASSESSMENT — PAIN DESCRIPTION - PAIN TYPE: TYPE: ACUTE PAIN

## 2024-05-14 ASSESSMENT — PAIN DESCRIPTION - ORIENTATION: ORIENTATION: LEFT

## 2024-05-14 ASSESSMENT — PAIN DESCRIPTION - DESCRIPTORS: DESCRIPTORS: DISCOMFORT

## 2024-05-14 NOTE — ED PROVIDER NOTES
Independent [facility] Visit.    HPI:  5/14/24,   Time: 6:40 PM EDT         Richy Lowery is a 48 y.o. male with past medical history of intractable back pain, neck pain with multiple surgeries, anxiety and hyperlipidemia presenting to the ED by private vehicle for low back pain.  He states symptoms worsened a couple days ago.  And they have gradually been worsening since Sunday.  Left lower back.  No injury or trauma.  He states when he had this a year ago he was diagnosed with a \"blood infection\".  He has concerns because he has had 3 surgeries on his neck with hardware present and he does not want it to become infected.  He is currently taking Norco's at home for his pain.  Nothing makes it better or worse.  No radiation symptoms.  Patient denies erythema, rash, edema, fevers, chills, body aches, headache, neck pain, numbness and tingling, abdominal pain, nausea, vomiting, diarrhea, saddle anesthesia, bladder or bowel incontinence, dysuria or hematuria.    ROS:   Pertinent positives and negatives are stated within HPI, all other systems reviewed and are negative.  --------------------------------------------- PAST HISTORY ---------------------------------------------  Past Medical History:  has a past medical history of Anxiety, HIGH CHOLESTEROL, and Osteoarthritis.    Past Surgical History:  has a past surgical history that includes Neck surgery; Spinal fusion; and cervical fusion (05/26/2015).    Social History:  reports that he has never smoked. He has never used smokeless tobacco. He reports current alcohol use. He reports current drug use. Drug: Marijuana (Weed).    Family History: family history includes Stroke in his father.     The patient’s home medications have been reviewed.    Allergies: Patient has no known allergies.    -------------------------------------------------- RESULTS -------------------------------------------------  All laboratory and radiology results have been personally reviewed by

## 2024-05-15 NOTE — DISCHARGE INSTR - COC
Continuity of Care Form    Patient Name: Richy Lowery   :  1975  MRN:  [id number]    Admit date:  2024  Discharge date:  ***    Code Status Order: Prior   Advance Directives:     Admitting Physician:  No admitting provider for patient encounter.  PCP: Frances Morel MD    Discharging Nurse: ***  Discharging Hospital Unit/Room#: 32/32  Discharging Unit Phone Number: ***    Emergency Contact:   Extended Emergency Contact Information  Primary Emergency Contact: Diane Lowery  Address: [address] [place] [address] [facility]  Home Phone: [phone]  Work Phone: [phone]  Relation: Spouse  Secondary Emergency Contact: Maria M Engel  Address: [address]           [place] [id number] [facility]  Home Phone: [phone]  Relation: Other    Past Surgical History:  Past Surgical History:   Procedure Laterality Date    CERVICAL FUSION  2015    C 3--T 1    NECK SURGERY      SPINAL FUSION      c 4-5-6       Immunization History:   Immunization History   Administered Date(s) Administered    COVID-19, PFIZER PURPLE top, DILUTE for use, (age 12 y+), 30mcg/0.3mL 2021, 10/15/2021    Influenza, AFLURIA (age 3 yrs+), FLUZONE, (age 6 mo+), MDV, 0.5mL 2018    Influenza, FLUARIX, FLULAVAL, FLUZONE (age 6 mo+) AND AFLURIA, (age 3 y+), PF, 0.5mL 10/22/2018, 10/07/2019    Rabies 10/24/2011    Rabies Immune Globulin 10/24/2011    Td, unspecified formulation 10/24/2011       Active Problems:  Patient Active Problem List   Diagnosis Code    Neck pain M54.2    Pure hypercholesterolemia E78.00    Vasectomy evaluation Z30.09    Intractable pain R52    Cervicalgia M54.2    Bronchospasm J98.01    History of fusion of cervical spine Z98.1    Intractable back pain M54.9    Lumbar radiculopathy M54.16       Isolation/Infection:   Isolation            No Isolation          Patient Infection Status       None to display            Nurse

## 2025-05-08 ENCOUNTER — HOSPITAL ENCOUNTER (EMERGENCY)
Age: 50
Discharge: HOME | End: 2025-05-09
Attending: STUDENT IN AN ORGANIZED HEALTH CARE EDUCATION/TRAINING PROGRAM
Payer: MEDICARE

## 2025-05-08 ENCOUNTER — APPOINTMENT (OUTPATIENT)
Dept: CT IMAGING | Age: 50
End: 2025-05-08
Payer: MEDICARE

## 2025-05-08 DIAGNOSIS — S39.012A STRAIN OF LUMBAR REGION, INITIAL ENCOUNTER: Primary | ICD-10-CM

## 2025-05-08 DIAGNOSIS — M47.816 OSTEOARTHRITIS OF LUMBAR SPINE, UNSPECIFIED SPINAL OSTEOARTHRITIS COMPLICATION STATUS: ICD-10-CM

## 2025-05-08 LAB
ALBUMIN SERPL-MCNC: 4.9 G/DL (ref 3.5–5.2)
ALP SERPL-CCNC: 62 U/L (ref 40–129)
ALT SERPL-CCNC: 58 U/L (ref 0–40)
ANION GAP SERPL CALCULATED.3IONS-SCNC: 11 MMOL/L (ref 7–16)
AST SERPL-CCNC: 34 U/L (ref 0–39)
BASOPHILS # BLD: 0.01 K/UL (ref 0–0.2)
BASOPHILS NFR BLD: 0 % (ref 0–2)
BILIRUB SERPL-MCNC: 0.5 MG/DL (ref 0–1.2)
BUN SERPL-MCNC: 11 MG/DL (ref 6–20)
CALCIUM SERPL-MCNC: 10.4 MG/DL (ref 8.6–10.2)
CHLORIDE SERPL-SCNC: 102 MMOL/L (ref 98–107)
CO2 SERPL-SCNC: 24 MMOL/L (ref 22–29)
CREAT SERPL-MCNC: 0.9 MG/DL (ref 0.7–1.2)
EOSINOPHIL # BLD: 0 K/UL (ref 0.05–0.5)
EOSINOPHILS RELATIVE PERCENT: 0 % (ref 0–6)
ERYTHROCYTE [DISTWIDTH] IN BLOOD BY AUTOMATED COUNT: 12.7 % (ref 11.5–15)
GFR, ESTIMATED: >90 ML/MIN/1.73M2
GLUCOSE SERPL-MCNC: 160 MG/DL (ref 74–99)
HCT VFR BLD AUTO: 43.5 % (ref 37–54)
HGB BLD-MCNC: 15 G/DL (ref 12.5–16.5)
IMM GRANULOCYTES # BLD AUTO: 0.07 K/UL (ref 0–0.58)
IMM GRANULOCYTES NFR BLD: 1 % (ref 0–5)
LACTATE BLDV-SCNC: 2 MMOL/L (ref 0.5–2.2)
LIPASE SERPL-CCNC: 27 U/L (ref 13–60)
LYMPHOCYTES NFR BLD: 0.82 K/UL (ref 1.5–4)
LYMPHOCYTES RELATIVE PERCENT: 9 % (ref 20–42)
MCH RBC QN AUTO: 29.8 PG (ref 26–35)
MCHC RBC AUTO-ENTMCNC: 34.5 G/DL (ref 32–34.5)
MCV RBC AUTO: 86.5 FL (ref 80–99.9)
MONOCYTES NFR BLD: 0.18 K/UL (ref 0.1–0.95)
MONOCYTES NFR BLD: 2 % (ref 2–12)
NEUTROPHILS NFR BLD: 89 % (ref 43–80)
NEUTS SEG NFR BLD: 8.43 K/UL (ref 1.8–7.3)
PLATELET # BLD AUTO: 242 K/UL (ref 130–450)
PMV BLD AUTO: 9.3 FL (ref 7–12)
POTASSIUM SERPL-SCNC: 4.8 MMOL/L (ref 3.5–5)
PROT SERPL-MCNC: 7.6 G/DL (ref 6.4–8.3)
RBC # BLD AUTO: 5.03 M/UL (ref 3.8–5.8)
SODIUM SERPL-SCNC: 137 MMOL/L (ref 132–146)
WBC OTHER # BLD: 9.5 K/UL (ref 4.5–11.5)

## 2025-05-08 PROCEDURE — 85025 COMPLETE CBC W/AUTO DIFF WBC: CPT

## 2025-05-08 PROCEDURE — 81001 URINALYSIS AUTO W/SCOPE: CPT

## 2025-05-08 PROCEDURE — 83605 ASSAY OF LACTIC ACID: CPT

## 2025-05-08 PROCEDURE — 96374 THER/PROPH/DIAG INJ IV PUSH: CPT

## 2025-05-08 PROCEDURE — 6370000000 HC RX 637 (ALT 250 FOR IP): Performed by: STUDENT IN AN ORGANIZED HEALTH CARE EDUCATION/TRAINING PROGRAM

## 2025-05-08 PROCEDURE — 83690 ASSAY OF LIPASE: CPT

## 2025-05-08 PROCEDURE — 6360000002 HC RX W HCPCS: Performed by: STUDENT IN AN ORGANIZED HEALTH CARE EDUCATION/TRAINING PROGRAM

## 2025-05-08 PROCEDURE — 96372 THER/PROPH/DIAG INJ SC/IM: CPT

## 2025-05-08 PROCEDURE — 80053 COMPREHEN METABOLIC PANEL: CPT

## 2025-05-08 PROCEDURE — 72131 CT LUMBAR SPINE W/O DYE: CPT

## 2025-05-08 PROCEDURE — 99285 EMERGENCY DEPT VISIT HI MDM: CPT

## 2025-05-08 PROCEDURE — 2580000003 HC RX 258: Performed by: STUDENT IN AN ORGANIZED HEALTH CARE EDUCATION/TRAINING PROGRAM

## 2025-05-08 PROCEDURE — 74177 CT ABD & PELVIS W/CONTRAST: CPT

## 2025-05-08 RX ORDER — ORPHENADRINE CITRATE 30 MG/ML
60 INJECTION INTRAMUSCULAR; INTRAVENOUS ONCE
Status: COMPLETED | OUTPATIENT
Start: 2025-05-08 | End: 2025-05-08

## 2025-05-08 RX ORDER — IOPAMIDOL 755 MG/ML
75 INJECTION, SOLUTION INTRAVASCULAR
Status: COMPLETED | OUTPATIENT
Start: 2025-05-08 | End: 2025-05-09

## 2025-05-08 RX ORDER — LIDOCAINE 4 G/G
1 PATCH TOPICAL DAILY
Status: DISCONTINUED | OUTPATIENT
Start: 2025-05-09 | End: 2025-05-08

## 2025-05-08 RX ORDER — 0.9 % SODIUM CHLORIDE 0.9 %
1000 INTRAVENOUS SOLUTION INTRAVENOUS ONCE
Status: COMPLETED | OUTPATIENT
Start: 2025-05-08 | End: 2025-05-09

## 2025-05-08 RX ORDER — LIDOCAINE 4 G/G
1 PATCH TOPICAL ONCE
Status: DISCONTINUED | OUTPATIENT
Start: 2025-05-08 | End: 2025-05-09 | Stop reason: HOSPADM

## 2025-05-08 RX ORDER — MORPHINE SULFATE 4 MG/ML
4 INJECTION, SOLUTION INTRAMUSCULAR; INTRAVENOUS ONCE
Refills: 0 | Status: COMPLETED | OUTPATIENT
Start: 2025-05-08 | End: 2025-05-08

## 2025-05-08 RX ADMIN — MORPHINE SULFATE 4 MG: 4 INJECTION INTRAVENOUS at 23:14

## 2025-05-08 RX ADMIN — ORPHENADRINE CITRATE 60 MG: 30 INJECTION, SOLUTION INTRAMUSCULAR; INTRAVENOUS at 23:28

## 2025-05-08 RX ADMIN — SODIUM CHLORIDE 1000 ML: 9 INJECTION, SOLUTION INTRAVENOUS at 23:21

## 2025-05-08 ASSESSMENT — PAIN DESCRIPTION - ORIENTATION
ORIENTATION: LOWER;MID;RIGHT
ORIENTATION: LOWER

## 2025-05-08 ASSESSMENT — ENCOUNTER SYMPTOMS
WHEEZING: 0
ABDOMINAL PAIN: 0
SORE THROAT: 0
EYE DISCHARGE: 0
EYE PAIN: 0
COUGH: 0
NAUSEA: 0
BACK PAIN: 1
EYE REDNESS: 0
VOMITING: 0
SHORTNESS OF BREATH: 0
DIARRHEA: 0
SINUS PRESSURE: 0

## 2025-05-08 ASSESSMENT — PAIN DESCRIPTION - DESCRIPTORS: DESCRIPTORS: ACHING

## 2025-05-08 ASSESSMENT — PAIN DESCRIPTION - PAIN TYPE: TYPE: ACUTE PAIN

## 2025-05-08 ASSESSMENT — PAIN SCALES - GENERAL
PAINLEVEL_OUTOF10: 10
PAINLEVEL_OUTOF10: 10

## 2025-05-08 ASSESSMENT — PAIN - FUNCTIONAL ASSESSMENT
PAIN_FUNCTIONAL_ASSESSMENT: PREVENTS OR INTERFERES SOME ACTIVE ACTIVITIES AND ADLS
PAIN_FUNCTIONAL_ASSESSMENT: 0-10

## 2025-05-08 ASSESSMENT — PAIN DESCRIPTION - ONSET: ONSET: ON-GOING

## 2025-05-08 ASSESSMENT — PAIN DESCRIPTION - LOCATION
LOCATION: BACK
LOCATION: BACK

## 2025-05-08 ASSESSMENT — PAIN DESCRIPTION - FREQUENCY: FREQUENCY: CONTINUOUS

## 2025-05-09 VITALS
BODY MASS INDEX: 22.73 KG/M2 | WEIGHT: 150 LBS | RESPIRATION RATE: 16 BRPM | HEART RATE: 60 BPM | HEIGHT: 68 IN | SYSTOLIC BLOOD PRESSURE: 123 MMHG | OXYGEN SATURATION: 98 % | TEMPERATURE: 97.9 F | DIASTOLIC BLOOD PRESSURE: 84 MMHG

## 2025-05-09 LAB
BILIRUB UR QL STRIP: NEGATIVE
CLARITY UR: CLEAR
COLOR UR: YELLOW
GLUCOSE UR STRIP-MCNC: NEGATIVE MG/DL
HGB UR QL STRIP.AUTO: NEGATIVE
KETONES UR STRIP-MCNC: NEGATIVE MG/DL
LEUKOCYTE ESTERASE UR QL STRIP: NEGATIVE
NITRITE UR QL STRIP: NEGATIVE
PH UR STRIP: 6.5 [PH] (ref 5–8)
PROT UR STRIP-MCNC: NEGATIVE MG/DL
RBC #/AREA URNS HPF: ABNORMAL /HPF
SP GR UR STRIP: <1.005 (ref 1–1.03)
UROBILINOGEN UR STRIP-ACNC: 0.2 EU/DL (ref 0–1)
WBC #/AREA URNS HPF: ABNORMAL /HPF

## 2025-05-09 PROCEDURE — 6360000002 HC RX W HCPCS: Performed by: STUDENT IN AN ORGANIZED HEALTH CARE EDUCATION/TRAINING PROGRAM

## 2025-05-09 PROCEDURE — 96375 TX/PRO/DX INJ NEW DRUG ADDON: CPT

## 2025-05-09 PROCEDURE — 6360000004 HC RX CONTRAST MEDICATION: Performed by: RADIOLOGY

## 2025-05-09 RX ORDER — DEXAMETHASONE SODIUM PHOSPHATE 10 MG/ML
10 INJECTION, SOLUTION INTRA-ARTICULAR; INTRALESIONAL; INTRAMUSCULAR; INTRAVENOUS; SOFT TISSUE ONCE
Status: COMPLETED | OUTPATIENT
Start: 2025-05-09 | End: 2025-05-09

## 2025-05-09 RX ORDER — KETOROLAC TROMETHAMINE 30 MG/ML
15 INJECTION, SOLUTION INTRAMUSCULAR; INTRAVENOUS ONCE
Status: COMPLETED | OUTPATIENT
Start: 2025-05-09 | End: 2025-05-09

## 2025-05-09 RX ADMIN — IOPAMIDOL 75 ML: 755 INJECTION, SOLUTION INTRAVENOUS at 00:09

## 2025-05-09 RX ADMIN — DEXAMETHASONE SODIUM PHOSPHATE 10 MG: 10 INJECTION INTRAMUSCULAR; INTRAVENOUS at 02:52

## 2025-05-09 RX ADMIN — KETOROLAC TROMETHAMINE 15 MG: 30 INJECTION, SOLUTION INTRAMUSCULAR at 01:42

## 2025-05-09 ASSESSMENT — PAIN DESCRIPTION - ORIENTATION
ORIENTATION: RIGHT;LOWER;MID
ORIENTATION: RIGHT;MID;LOWER
ORIENTATION: RIGHT;LOWER;MID

## 2025-05-09 ASSESSMENT — PAIN DESCRIPTION - ONSET
ONSET: ON-GOING
ONSET: ON-GOING

## 2025-05-09 ASSESSMENT — PAIN SCALES - GENERAL
PAINLEVEL_OUTOF10: 10

## 2025-05-09 ASSESSMENT — PAIN DESCRIPTION - PAIN TYPE
TYPE: ACUTE PAIN
TYPE: ACUTE PAIN

## 2025-05-09 ASSESSMENT — PAIN DESCRIPTION - DESCRIPTORS
DESCRIPTORS: SHARP;SHOOTING;SORE;DISCOMFORT
DESCRIPTORS: SHARP;SHOOTING;SORE;DISCOMFORT

## 2025-05-09 ASSESSMENT — PAIN DESCRIPTION - FREQUENCY
FREQUENCY: CONTINUOUS
FREQUENCY: CONTINUOUS

## 2025-05-09 ASSESSMENT — PAIN DESCRIPTION - LOCATION
LOCATION: BACK

## 2025-05-09 NOTE — ED PROVIDER NOTES
Department of Emergency Medicine   ED  Provider Note  Admit Date/RoomTime: 5/8/2025 10:09 PM  ED Room: 02/02              [facility]     Richy Lowery is a 49 y.o. male with a PMHx significant for  back issues, tractable back pain follows with pain management  who presents for evaluation of low right-sided back pain, beginning prior to arrival. The complaint has been persistent, moderate in severity, and worsened by movements.   The patient states that 2 days he began to have pain in his low back.  States that he is disabled from his history of back problems, he was just at home when this started.  Denies any trauma or injury.  Notes it is more of a constant ache in nature, no radiation, does not radiate down to the penis, groin, testicles, no urinary symptoms.  Does rated 10 out of 10, states that he is on Norco at home and is not touching the pain.  He has not tried any anti-inflammatories.  Denies any nausea, vomiting, fevers, chills, chest pain, shortness of breath, urinary symptoms, numbness or tingling, diarrhea or constipation.  Does note that the pain feels similar to when he had some sort of blood infection a few years ago.     Review of Systems   Constitutional:  Negative for chills and fever.   HENT:  Negative for ear pain, sinus pressure and sore throat.    Eyes:  Negative for pain, discharge and redness.   Respiratory:  Negative for cough, shortness of breath and wheezing.    Cardiovascular:  Negative for chest pain.   Gastrointestinal:  Negative for abdominal pain, diarrhea, nausea and vomiting.   Genitourinary:  Negative for dysuria and frequency.   Musculoskeletal:  Positive for back pain. Negative for arthralgias.   Skin:  Negative for rash and wound.   Neurological:  Negative for weakness and headaches.   Hematological:  Negative for adenopathy.   All other systems reviewed and are negative.       Physical Exam  Vitals and nursing note reviewed.   Constitutional:       General: He is in acute  lumbar spine.             ------------------------- NURSING NOTES AND VITALS REVIEWED ---------------------------  Date / Time Roomed:  5/8/2025 10:09 PM  ED Bed Assignment:  02/02    The nursing notes within the ED encounter and vital signs as below have been reviewed.   /84   Pulse 60   Temp 97.9 °F (36.6 °C) (Oral)   Resp 16   Ht 1.727 m (5' 8\")   Wt 68 kg (150 lb)   SpO2 98%   BMI 22.81 kg/m²   Oxygen Saturation Interpretation: Normal      ------------------------------------------ PROGRESS NOTES ------------------------------------------  7:20 PM EDT  I have spoken with the patient and discussed today’s results, in addition to providing specific details for the plan of care and counseling regarding the diagnosis and prognosis.  Their questions are answered at this time and they are agreeable with the plan. I discussed at length with them reasons for immediate return here for re evaluation. They will followup with their  pain management specialist and primary care physician by calling their office tomorrow.      --------------------------------- ADDITIONAL PROVIDER NOTES ---------------------------------  At this time the patient is without objective evidence of an acute process requiring hospitalization or inpatient management.  They have remained hemodynamically stable throughout their entire ED visit and are stable for discharge with outpatient follow-up.     The plan has been discussed in detail and they are aware of the specific conditions for emergent return, as well as the importance of follow-up.      Discharge Medication List as of 5/9/2025  2:55 AM          Diagnosis:  1. Strain of lumbar region, initial encounter    2. Osteoarthritis of lumbar spine, unspecified spinal osteoarthritis complication status        Disposition:  Patient's disposition: Discharge to home  Patient's condition is stable.       Mathew Vera DO  05/15/25 1922

## 2025-05-09 NOTE — DISCHARGE INSTRUCTIONS
CT ABDOMEN PELVIS W IV CONTRAST Additional Contrast? None   Final Result   1. Circumferential bladder wall thickening may be secondary to   underdistention or cystitis. Correlation with urinalysis is recommended.   2. Mild degenerative disc disease in the lower lumbar spine.         CT LUMBAR SPINE WO CONTRAST   Final Result   1. Circumferential bladder wall thickening may be secondary to   underdistention or cystitis. Correlation with urinalysis is recommended.   2. Mild degenerative disc disease in the lower lumbar spine.